# Patient Record
Sex: FEMALE | Race: WHITE | NOT HISPANIC OR LATINO | Employment: PART TIME | URBAN - METROPOLITAN AREA
[De-identification: names, ages, dates, MRNs, and addresses within clinical notes are randomized per-mention and may not be internally consistent; named-entity substitution may affect disease eponyms.]

---

## 2017-06-01 ENCOUNTER — TELEPHONE (OUTPATIENT)
Dept: FAMILY MEDICINE | Facility: CLINIC | Age: 62
End: 2017-06-01

## 2017-07-21 ENCOUNTER — HOSPITAL ENCOUNTER (OUTPATIENT)
Dept: MAMMOGRAPHY | Facility: CLINIC | Age: 62
Discharge: HOME | End: 2017-07-21
Attending: INTERNAL MEDICINE | Admitting: INTERNAL MEDICINE
Payer: COMMERCIAL

## 2017-07-21 DIAGNOSIS — Z12.31 VISIT FOR SCREENING MAMMOGRAM: ICD-10-CM

## 2017-07-21 PROCEDURE — G0202 SCR MAMMO BI INCL CAD: HCPCS

## 2017-07-29 ENCOUNTER — HEALTH MAINTENANCE LETTER (OUTPATIENT)
Age: 62
End: 2017-07-29

## 2017-10-04 ENCOUNTER — DOCUMENTATION ONLY (OUTPATIENT)
Dept: LAB | Facility: CLINIC | Age: 62
End: 2017-10-04

## 2017-10-04 DIAGNOSIS — I10 BENIGN ESSENTIAL HYPERTENSION: Primary | Chronic | ICD-10-CM

## 2017-10-04 NOTE — PROGRESS NOTES
Please review, associate diagnosis, and sign pending pre physical lab orders for patient's upcoming 10/11/17 lab appointment.     Thank you,  Lab

## 2017-10-11 DIAGNOSIS — I10 BENIGN ESSENTIAL HYPERTENSION: Chronic | ICD-10-CM

## 2017-10-11 LAB
ALBUMIN SERPL-MCNC: 4.3 G/DL (ref 3.4–5)
ALP SERPL-CCNC: 69 U/L (ref 40–150)
ALT SERPL W P-5'-P-CCNC: 29 U/L (ref 0–50)
ANION GAP SERPL CALCULATED.3IONS-SCNC: 10 MMOL/L (ref 3–14)
AST SERPL W P-5'-P-CCNC: 30 U/L (ref 0–45)
BILIRUB SERPL-MCNC: 0.8 MG/DL (ref 0.2–1.3)
BUN SERPL-MCNC: 15 MG/DL (ref 7–30)
CALCIUM SERPL-MCNC: 9.5 MG/DL (ref 8.5–10.1)
CHLORIDE SERPL-SCNC: 104 MMOL/L (ref 94–109)
CHOLEST SERPL-MCNC: 226 MG/DL
CO2 SERPL-SCNC: 26 MMOL/L (ref 20–32)
CREAT SERPL-MCNC: 0.91 MG/DL (ref 0.52–1.04)
ERYTHROCYTE [DISTWIDTH] IN BLOOD BY AUTOMATED COUNT: 11.9 % (ref 10–15)
GFR SERPL CREATININE-BSD FRML MDRD: 62 ML/MIN/1.7M2
GLUCOSE SERPL-MCNC: 95 MG/DL (ref 70–99)
HCT VFR BLD AUTO: 45.6 % (ref 35–47)
HDLC SERPL-MCNC: 100 MG/DL
HGB BLD-MCNC: 15.1 G/DL (ref 11.7–15.7)
LDLC SERPL CALC-MCNC: 105 MG/DL
MCH RBC QN AUTO: 32.1 PG (ref 26.5–33)
MCHC RBC AUTO-ENTMCNC: 33.1 G/DL (ref 31.5–36.5)
MCV RBC AUTO: 97 FL (ref 78–100)
NONHDLC SERPL-MCNC: 126 MG/DL
PLATELET # BLD AUTO: 247 10E9/L (ref 150–450)
POTASSIUM SERPL-SCNC: 4 MMOL/L (ref 3.4–5.3)
PROT SERPL-MCNC: 7.9 G/DL (ref 6.8–8.8)
RBC # BLD AUTO: 4.7 10E12/L (ref 3.8–5.2)
SODIUM SERPL-SCNC: 140 MMOL/L (ref 133–144)
TRIGL SERPL-MCNC: 105 MG/DL
WBC # BLD AUTO: 7.7 10E9/L (ref 4–11)

## 2017-10-11 PROCEDURE — 85027 COMPLETE CBC AUTOMATED: CPT | Performed by: INTERNAL MEDICINE

## 2017-10-11 PROCEDURE — 80053 COMPREHEN METABOLIC PANEL: CPT | Performed by: INTERNAL MEDICINE

## 2017-10-11 PROCEDURE — 36415 COLL VENOUS BLD VENIPUNCTURE: CPT | Performed by: INTERNAL MEDICINE

## 2017-10-11 PROCEDURE — 80061 LIPID PANEL: CPT | Performed by: INTERNAL MEDICINE

## 2017-10-17 NOTE — PROGRESS NOTES
"   SUBJECTIVE:   CC: Suzi A Friedell is an 61 year old woman who presents for preventive health visit.     Healthy Habits:    Do you get at least three servings of calcium containing foods daily (dairy, green leafy vegetables, etc.)? {YES/NO, DAIRY INTAKE:296830::\"yes\"}    Amount of exercise or daily activities, outside of work: {AMOUNT EXERCISE:180520}    Problems taking medications regularly {Yes /No default:399461::\"No\"}    Medication side effects: {Yes /No default.:237523::\"No\"}    Have you had an eye exam in the past two years? {YESNOBLANK:572152}    Do you see a dentist twice per year? {YESNOBLANK:467020}    Do you have sleep apnea, excessive snoring or daytime drowsiness?{YESNOBLANK:446841}    {Outside tests to abstract? :277716}    {additional problems to add (Optional):255379}    Today's PHQ-2 Score:   PHQ-2 ( 1999 Pfizer) 10/15/2017 10/17/2016   Q1: Little interest or pleasure in doing things 0 0   Q2: Feeling down, depressed or hopeless 0 0   PHQ-2 Score 0 0   Q1: Little interest or pleasure in doing things Not at all -   Q2: Feeling down, depressed or hopeless Not at all -   PHQ-2 Score 0 -     {PHQ-2 LOOK IN ASSESSMENTS (Optional) :124085}  Abuse: Current or Past(Physical, Sexual or Emotional)- {YES/NO/NA:053853}  Do you feel safe in your environment - {YES/NO/NA:132562}    Social History   Substance Use Topics     Smoking status: Never Smoker     Smokeless tobacco: Never Used     Alcohol use 0.0 oz/week     0 Standard drinks or equivalent per week      Comment: 1 beer a night     {ETOH AUDIT:280674}    Reviewed orders with patient.  Reviewed health maintenance and updated orders accordingly - {Yes/No:944087::\"Yes\"}  {Chronicprobdata (Optional):252958}    {Mammo Decision Support (Optional):529683}    Pertinent mammograms are reviewed under the imaging tab.  History of abnormal Pap smear: {PAP HX:358689}    Reviewed and updated as needed this visit by clinical staff         Reviewed and updated as needed " "this visit by Provider        {HISTORY OPTIONS (Optional):963816}    ROS:  {FEMALE PREVENTATIVE ROS:318513}    OBJECTIVE:   There were no vitals taken for this visit.  EXAM:  {Exam Choices:261873}    ASSESSMENT/PLAN:   {Diag Picklist:945217}    COUNSELING:   {FEMALE COUNSELING MESSAGES:289169::\"Reviewed preventive health counseling, as reflected in patient instructions\"}    {BP Counseling- Complete if BP >= 120/80  (Optional):062560}     reports that she has never smoked. She has never used smokeless tobacco.  {Tobacco Cessation -- Complete if patient is a smoker (Optional):957912}  Estimated body mass index is 19.01 kg/(m^2) as calculated from the following:    Height as of 10/17/16: 5' 2.4\" (1.585 m).    Weight as of 10/17/16: 105 lb 4.8 oz (47.8 kg).   {Weight Management Plan (ACO) Complete if BMI is abnormal-  Ages 18-64  BMI >24.9.  Age 65+ with BMI <23 or >30 (Optional):117564}    Counseling Resources:  ATP IV Guidelines  Pooled Cohorts Equation Calculator  Breast Cancer Risk Calculator  FRAX Risk Assessment  ICSI Preventive Guidelines  Dietary Guidelines for Americans, 2010  USDA's MyPlate  ASA Prophylaxis  Lung CA Screening    Esther Flores,   [facility]  "

## 2017-10-17 NOTE — PATIENT INSTRUCTIONS
Keep up the great work  Follow up annually or as needed    Preventive Health Recommendations  Female Ages 50 - 64    Yearly exam: See your health care provider every year in order to  o Review health changes.   o Discuss preventive care.    o Review your medicines if your doctor has prescribed any.      Get a Pap test every three years (unless you have an abnormal result and your provider advises testing more often).    If you get Pap tests with HPV test, you only need to test every 5 years, unless you have an abnormal result.     You do not need a Pap test if your uterus was removed (hysterectomy) and you have not had cancer.    You should be tested each year for STDs (sexually transmitted diseases) if you're at risk.     Have a mammogram every 1 to 2 years.    Have a colonoscopy at age 50, or have a yearly FIT test (stool test). These exams screen for colon cancer.      Have a cholesterol test every 5 years, or more often if advised.    Have a diabetes test (fasting glucose) every three years. If you are at risk for diabetes, you should have this test more often.     If you are at risk for osteoporosis (brittle bone disease), think about having a bone density scan (DEXA).    Shots: Get a flu shot each year. Get a tetanus shot every 10 years.    Nutrition:     Eat at least 5 servings of fruits and vegetables each day.    Eat whole-grain bread, whole-wheat pasta and brown rice instead of white grains and rice.    Talk to your provider about Calcium and Vitamin D.     Lifestyle    Exercise at least 150 minutes a week (30 minutes a day, 5 days a week). This will help you control your weight and prevent disease.    Limit alcohol to one drink per day.    No smoking.     Wear sunscreen to prevent skin cancer.     See your dentist every six months for an exam and cleaning.    See your eye doctor every 1 to 2 years.

## 2017-10-17 NOTE — PROGRESS NOTES
SUBJECTIVE:   CC: Suzi A Friedell is an 61 year old woman who presents for preventive health visit.     Physical   Annual:     Getting at least 3 servings of Calcium per day::  Yes    Bi-annual eye exam::  Yes    Dental care twice a year::  Yes    Sleep apnea or symptoms of sleep apnea::  None    Diet::  Regular (no restrictions)    Frequency of exercise::  6-7 days/week    Duration of exercise::  Greater than 60 minutes    Taking medications regularly::  Yes    Medication side effects::  Not applicable    Additional concerns today::  No    Elsy is here in follow up today for her annual exam  Had previsit great fasting labs  BPs great at home  3 miles running in AM and 2 miles walking in afternoon  Several years ago abnormal pap but normal for several years since  H/o benign genital cyst that pops over time  No breast changes  Feels really healthy  New 4 month old grandson in [place]      Today's PHQ-2 Score:   PHQ-2 ( 1999 Pfizer) 10/18/2017   Q1: Little interest or pleasure in doing things 0   Q2: Feeling down, depressed or hopeless 0   PHQ-2 Score 0   Q1: Little interest or pleasure in doing things -   Q2: Feeling down, depressed or hopeless -   PHQ-2 Score -       Abuse: Current or Past(Physical, Sexual or Emotional)- No  Do you feel safe in your environment - Yes    Social History   Substance Use Topics     Smoking status: Never Smoker     Smokeless tobacco: Never Used     Alcohol use 0.0 oz/week      Comment: 1 beer a night     The patient does not drink >3 drinks per day nor >7 drinks per week.    Reviewed orders with patient.  Reviewed health maintenance and updated orders accordingly - Yes  Patient over age 50, mutual decision to screen reflected in health maintenance.  Pertinent mammograms are reviewed under the imaging tab.  History of abnormal Pap smear: NO - age 30-65 PAP every 5 years with negative HPV co-testing recommended - Normal pap in 2011 and 2014 per [facility]     ROS:  Comprehensive ROS  "negative unless as stated above in HPI.       OBJECTIVE:   /89 (BP Location: Left arm, Cuff Size: Adult Regular)  Pulse 86  Temp 97.5  F (36.4  C) (Oral)  Ht 5' 2.4\" (1.585 m)  Wt 103 lb (46.7 kg)  SpO2 100%  Breastfeeding? No  BMI 18.6 kg/m2  EXAM:  GENERAL APPEARANCE: healthy, alert and no distress  EYES: Eyes grossly normal to inspection, PERRL and conjunctivae and sclerae normal  HENT: ear canals and TM's normal, nose and mouth without ulcers or lesions, oropharynx clear and oral mucous membranes moist  NECK: no adenopathy, no asymmetry, masses, or scars and thyroid normal to palpation  RESP: lungs clear to auscultation - no rales, rhonchi or wheezes  CV: regular rate and rhythm, normal S1 S2, no S3 or S4, no murmur, click or rub, no peripheral edema and no carotid bruits  ABDOMEN: soft, nontender, no hepatosplenomegaly, no masses and bowel sounds normal   (female): normal female external genitalia, normal urethral meatus, vaginal mucosal atrophy noted, normal cervix, adnexae, and uterus without masses or abnormal discharge  MS: no musculoskeletal defects are noted and gait is age appropriate without ataxia  SKIN: no suspicious lesions or rashes  NEURO: Normal strength and tone, mentation intact and speech normal  PSYCH: mentation appears normal and affect normal/bright    ASSESSMENT/PLAN:   1. Preventative health care  She declines colonoscopy maybe even next year  She brought in a copy of her normal colonoscopy done at [place] on 6/5/2008 by Dr. Chua  Also her DEXA was done 3/27/2008 at [facility] showing osteoporosis of left hip at T score -2.5; R hip -1.8 and Lspine -1.9.   Reviewed excellent previsit fasting labs    2. Benign essential hypertension; goal < 140/90  At goal (but even better at home)  - lisinopril (PRINIVIL/ZESTRIL) 2.5 MG tablet; Take 1 tablet (2.5 mg) by mouth daily  Dispense: 90 tablet; Refill: 3    3. Screening for malignant neoplasm of cervix  Plan that this could be last pap " "if within normal limits   Normal pap in 2011 and 2014 per [facility]  - Pap imaged thin layer screen with HPV - recommended age 30 - 65 years (select HPV order below)  - HPV High Risk Types DNA Cervical    COUNSELING:  Reviewed preventive health counseling, as reflected in patient instructions       Regular exercise       Healthy diet/nutrition   reports that she has never smoked. She has never used smokeless tobacco.  Estimated body mass index is 18.6 kg/(m^2) as calculated from the following:    Height as of this encounter: 5' 2.4\" (1.585 m).    Weight as of this encounter: 103 lb (46.7 kg).       Patient Instructions   Keep up the great work  Follow up annually or as needed        Esther Flores, DO  [facility]    "

## 2017-10-18 ENCOUNTER — OFFICE VISIT (OUTPATIENT)
Dept: FAMILY MEDICINE | Facility: CLINIC | Age: 62
End: 2017-10-18
Payer: COMMERCIAL

## 2017-10-18 VITALS
BODY MASS INDEX: 18.95 KG/M2 | OXYGEN SATURATION: 100 % | HEIGHT: 62 IN | HEART RATE: 86 BPM | TEMPERATURE: 97.5 F | WEIGHT: 103 LBS | SYSTOLIC BLOOD PRESSURE: 131 MMHG | DIASTOLIC BLOOD PRESSURE: 89 MMHG

## 2017-10-18 DIAGNOSIS — Z12.4 SCREENING FOR MALIGNANT NEOPLASM OF CERVIX: ICD-10-CM

## 2017-10-18 DIAGNOSIS — I10 BENIGN ESSENTIAL HYPERTENSION: Chronic | ICD-10-CM

## 2017-10-18 DIAGNOSIS — Z00.00 PREVENTATIVE HEALTH CARE: Primary | ICD-10-CM

## 2017-10-18 PROCEDURE — 87624 HPV HI-RISK TYP POOLED RSLT: CPT | Performed by: INTERNAL MEDICINE

## 2017-10-18 PROCEDURE — G0145 SCR C/V CYTO,THINLAYER,RESCR: HCPCS | Performed by: INTERNAL MEDICINE

## 2017-10-18 PROCEDURE — 99396 PREV VISIT EST AGE 40-64: CPT | Performed by: INTERNAL MEDICINE

## 2017-10-18 RX ORDER — LISINOPRIL 2.5 MG/1
2.5 TABLET ORAL DAILY
Qty: 90 TABLET | Refills: 3 | Status: SHIPPED | OUTPATIENT
Start: 2017-10-18 | End: 2018-10-17

## 2017-10-18 NOTE — MR AVS SNAPSHOT
After Visit Summary   10/18/2017    Suzi A Friedell    MRN: [id number]           Patient Information     Date Of Birth          1955        Visit Information        Provider Department      10/18/2017 9:30 AM Esther Flores, DO [facility]        Today's Diagnoses     Preventative health care    -  1    Benign essential hypertension; goal < 140/90        Screening for malignant neoplasm of cervix          Care Instructions    Keep up the great work  Follow up annually or as needed    Preventive Health Recommendations  Female Ages 50 - 64    Yearly exam: See your health care provider every year in order to  o Review health changes.   o Discuss preventive care.    o Review your medicines if your doctor has prescribed any.      Get a Pap test every three years (unless you have an abnormal result and your provider advises testing more often).    If you get Pap tests with HPV test, you only need to test every 5 years, unless you have an abnormal result.     You do not need a Pap test if your uterus was removed (hysterectomy) and you have not had cancer.    You should be tested each year for STDs (sexually transmitted diseases) if you're at risk.     Have a mammogram every 1 to 2 years.    Have a colonoscopy at age 50, or have a yearly FIT test (stool test). These exams screen for colon cancer.      Have a cholesterol test every 5 years, or more often if advised.    Have a diabetes test (fasting glucose) every three years. If you are at risk for diabetes, you should have this test more often.     If you are at risk for osteoporosis (brittle bone disease), think about having a bone density scan (DEXA).    Shots: Get a flu shot each year. Get a tetanus shot every 10 years.    Nutrition:     Eat at least 5 servings of fruits and vegetables each day.    Eat whole-grain bread, whole-wheat pasta and brown rice instead of white grains and rice.    Talk to your provider about Calcium and Vitamin  "D.     Lifestyle    Exercise at least 150 minutes a week (30 minutes a day, 5 days a week). This will help you control your weight and prevent disease.    Limit alcohol to one drink per day.    No smoking.     Wear sunscreen to prevent skin cancer.     See your dentist every six months for an exam and cleaning.    See your eye doctor every 1 to 2 years.            Follow-ups after your visit        Who to contact     If you have questions or need follow up information about today's clinic visit or your schedule please contact [facility] directly at [phone].  Normal or non-critical lab and imaging results will be communicated to you by JumpTheClubhart, letter or phone within 4 business days after the clinic has received the results. If you do not hear from us within 7 days, please contact the clinic through Fanaticallt or phone. If you have a critical or abnormal lab result, we will notify you by phone as soon as possible.  Submit refill requests through [facility] or call your pharmacy and they will forward the refill request to us. Please allow 3 business days for your refill to be completed.          Additional Information About Your Visit        MyChart Information     [facility] gives you secure access to your electronic health record. If you see a primary care provider, you can also send messages to your care team and make appointments. If you have questions, please call your primary care clinic.  If you do not have a primary care provider, please call [phone] and they will assist you.        Care EveryWhere ID     This is your Care EveryWhere ID. This could be used by other organizations to access your [facility] medical records  [id number]        Your Vitals Were     Pulse Temperature Height Pulse Oximetry Breastfeeding? BMI (Body Mass Index)    86 97.5  F (36.4  C) (Oral) 5' 2.4\" (1.585 m) 100% No 18.6 kg/m2       Blood Pressure from Last 3 Encounters:   10/18/17 131/89   10/17/16 130/88    Weight from " Last 3 Encounters:   10/18/17 103 lb (46.7 kg)   10/17/16 105 lb 4.8 oz (47.8 kg)              We Performed the Following     HPV High Risk Types DNA Cervical     Pap imaged thin layer screen with HPV - recommended age 30 - 65 years (select HPV order below)          Where to get your medicines      These medications were sent to [id number] - [place], [place] - [address] [facility] [address]  [address] [place] [address] [address]     Phone:  [phone]     lisinopril 2.5 MG tablet          Primary Care Provider Office Phone # Fax #    Esther Flores,  [phone] [address]  [place] [address]        Equal Access to Services     YUMIKO MERINO : Hadii dominik hua hadasho Soomaali, waaxda luqadaha, qaybta kaalmada adeegyada, adilia whitlock . So [facility] [phone].    ATENCIÓN: Si habla español, tiene a biggs disposición servicios gratuitos de asistencia lingüística. Leodan al [phone].    We comply with applicable federal civil rights laws and [place] laws. We do not discriminate on the basis of race, color, national origin, age, disability, sex, sexual orientation, or gender identity.            Thank you!     Thank you for choosing [facility]  for your care. Our goal is always to provide you with excellent care. Hearing back from our patients is one way we can continue to improve our services. Please take a few minutes to complete the written survey that you may receive in the mail after your visit with us. Thank you!             Your Updated Medication List - Protect others around you: Learn how to safely use, store and throw away your medicines at www.disposemymeds.org.          This list is accurate as of: 10/18/17 10:02 AM.  Always use your most recent med list.                   Brand Name Dispense Instructions for use Diagnosis    CHOLESTOFF PO      Take 3 tablets by mouth daily        GLUCOSAMINE CHOND COMPLEX/MSM  PO      Take 1 tablet by mouth daily        lisinopril 2.5 MG tablet    PRINIVIL/Zestril    90 tablet    Take 1 tablet (2.5 mg) by mouth daily    Benign essential hypertension       loratadine 10 MG tablet    CLARITIN     Take 10 mg by mouth daily        Magnesium 400 MG Caps      Take 1 capsule by mouth daily        MULTI VITAMIN PO      Take 1 tablet by mouth daily        VITAMIN D3 PO      Take 400 Units by mouth daily

## 2017-10-18 NOTE — NURSING NOTE
"Chief Complaint   Patient presents with     Physical       Initial /89 (BP Location: Left arm, Cuff Size: Adult Regular)  Pulse 86  Temp 97.5  F (36.4  C) (Oral)  Ht 5' 2.4\" (1.585 m)  Wt 103 lb (46.7 kg)  SpO2 100%  Breastfeeding? No  BMI 18.6 kg/m2 Estimated body mass index is 18.6 kg/(m^2) as calculated from the following:    Height as of this encounter: 5' 2.4\" (1.585 m).    Weight as of this encounter: 103 lb (46.7 kg).  Medication Reconciliation: complete       BURAK Steele      "

## 2017-10-23 LAB
COPATH REPORT: NORMAL
PAP: NORMAL

## 2017-10-24 LAB
FINAL DIAGNOSIS: NORMAL
HPV HR 12 DNA CVX QL NAA+PROBE: NEGATIVE
HPV16 DNA SPEC QL NAA+PROBE: NEGATIVE
HPV18 DNA SPEC QL NAA+PROBE: NEGATIVE
SPECIMEN DESCRIPTION: NORMAL

## 2018-01-05 DIAGNOSIS — I10 BENIGN ESSENTIAL HYPERTENSION: ICD-10-CM

## 2018-01-05 NOTE — TELEPHONE ENCOUNTER
Requested Prescriptions   Pending Prescriptions Disp Refills     lisinopril (PRINIVIL/ZESTRIL) 2.5 MG tablet [Pharmacy Med Name: LISINOPRIL 2.5MG TABLETS] 90 tablet 0     Sig: TAKE 1 TABLET(2.5 MG) BY MOUTH DAILY    ACE Inhibitors (Including Combos) Protocol Passed    1/5/2018  3:35 AM       Passed - Blood pressure under 140/90    BP Readings from Last 3 Encounters:   10/18/17 131/89   10/17/16 130/88                Passed - Recent or future visit with authorizing provider's specialty    Patient had office visit in the last year or has a visit in the next 30 days with authorizing provider.  See chart review.              Passed - Patient is age 18 or older       Passed - No active pregnancy on record       Passed - Normal serum creatinine on file in past 12 months    Recent Labs   Lab Test  10/11/17   0725   CR  0.91            Passed - Normal serum potassium on file in past 12 months    Recent Labs   Lab Test  10/11/17   0725   POTASSIUM  4.0            Passed - No positive pregnancy test in past 12 months        lisinopril (PRINIVIL/ZESTRIL) 2.5 MG tablet 90 tablet 3 10/18/2017       Last Written Prescription Date:  10/18/2017  Last Fill Quantity: 90,  # refills: 3   Last Office Visit with G, P or [facility] prescribing provider:  10/18/2017   Future Office Visit: Unknown

## 2018-01-08 RX ORDER — LISINOPRIL 2.5 MG/1
TABLET ORAL
Refills: 0
Start: 2018-01-08

## 2018-03-01 ENCOUNTER — TRANSFERRED RECORDS (OUTPATIENT)
Dept: HEALTH INFORMATION MANAGEMENT | Facility: CLINIC | Age: 63
End: 2018-03-01

## 2018-03-05 ENCOUNTER — OFFICE VISIT (OUTPATIENT)
Dept: FAMILY MEDICINE | Facility: CLINIC | Age: 63
End: 2018-03-05
Payer: COMMERCIAL

## 2018-03-05 VITALS
TEMPERATURE: 97.5 F | WEIGHT: 106 LBS | OXYGEN SATURATION: 99 % | BODY MASS INDEX: 19.51 KG/M2 | DIASTOLIC BLOOD PRESSURE: 91 MMHG | HEART RATE: 86 BPM | HEIGHT: 62 IN | SYSTOLIC BLOOD PRESSURE: 137 MMHG

## 2018-03-05 DIAGNOSIS — Z01.818 PREOP GENERAL PHYSICAL EXAM: Primary | ICD-10-CM

## 2018-03-05 DIAGNOSIS — H35.372 MACULAR PUCKERING OF RETINA, LEFT: ICD-10-CM

## 2018-03-05 LAB
ANION GAP SERPL CALCULATED.3IONS-SCNC: 5 MMOL/L (ref 3–14)
BUN SERPL-MCNC: 25 MG/DL (ref 7–30)
CALCIUM SERPL-MCNC: 9.3 MG/DL (ref 8.5–10.1)
CHLORIDE SERPL-SCNC: 104 MMOL/L (ref 94–109)
CO2 SERPL-SCNC: 30 MMOL/L (ref 20–32)
CREAT SERPL-MCNC: 0.9 MG/DL (ref 0.52–1.04)
GFR SERPL CREATININE-BSD FRML MDRD: 63 ML/MIN/1.7M2
GLUCOSE SERPL-MCNC: 94 MG/DL (ref 70–99)
POTASSIUM SERPL-SCNC: 4.3 MMOL/L (ref 3.4–5.3)
SODIUM SERPL-SCNC: 139 MMOL/L (ref 133–144)

## 2018-03-05 PROCEDURE — 36415 COLL VENOUS BLD VENIPUNCTURE: CPT | Performed by: NURSE PRACTITIONER

## 2018-03-05 PROCEDURE — 99214 OFFICE O/P EST MOD 30 MIN: CPT | Performed by: NURSE PRACTITIONER

## 2018-03-05 PROCEDURE — 80048 BASIC METABOLIC PNL TOTAL CA: CPT | Performed by: NURSE PRACTITIONER

## 2018-03-05 NOTE — MR AVS SNAPSHOT
After Visit Summary   3/5/2018    Suzi A Friedell    MRN: [id number]           Patient Information     Date Of Birth          1955        Visit Information        Provider Department      3/5/2018 9:00 AM Nicolasa Reddy APRN [facility]        Today's Diagnoses     Preop general physical exam    -  1    Macular puckering of retina, left          Care Instructions      Before Your Surgery      Call your surgeon if there is any change in your health. This includes signs of a cold or flu (such as a sore throat, runny nose, cough, rash or fever).    Do not smoke, drink alcohol or take over the counter medicine (unless your surgeon or primary care doctor tells you to) for the 24 hours before and after surgery.    If you take prescribed drugs: Follow your doctor s orders about which medicines to take and which to stop until after surgery.    Eating and drinking prior to surgery: follow the instructions from your surgeon    Take a shower or bath the night before surgery. Use the soap your surgeon gave you to gently clean your skin. If you do not have soap from your surgeon, use your regular soap. Do not shave or scrub the surgery site.  Wear clean pajamas and have clean sheets on your bed.     Before Your Surgery      Call your surgeon if there is any change in your health. This includes signs of a cold or flu (such as a sore throat, runny nose, cough, rash or fever).    Do not smoke, drink alcohol or take over the counter medicine (unless your surgeon or primary care doctor tells you to) for the 24 hours before and after surgery.    If you take prescribed drugs: Follow your doctor s orders about which medicines to take and which to stop until after surgery.    Eating and drinking prior to surgery: follow the instructions from your surgeon    Take a shower or bath the night before surgery. Use the soap your surgeon gave you to gently clean your skin. If you do not have soap from your  "surgeon, use your regular soap. Do not shave or scrub the surgery site.  Wear clean pajamas and have clean sheets on your bed.           Follow-ups after your visit        Follow-up notes from your care team     Return if symptoms worsen or fail to improve.      Your next 10 appointments already scheduled     Mar 15, 2018   Procedure with Harley Lindquist MD   [facility] (--)    [address], [address]  [facility] [address] [phone]              Who to contact     If you have questions or need follow up information about today's clinic visit or your schedule please contact [facility] directly at [phone].  Normal or non-critical lab and imaging results will be communicated to you by Vanuhart, letter or phone within 4 business days after the clinic has received the results. If you do not hear from us within 7 days, please contact the clinic through Vanuhart or phone. If you have a critical or abnormal lab result, we will notify you by phone as soon as possible.  Submit refill requests through Smartjog or call your pharmacy and they will forward the refill request to us. Please allow 3 business days for your refill to be completed.          Additional Information About Your Visit        VanuharApnaPaisa Information     Smartjog gives you secure access to your electronic health record. If you see a primary care provider, you can also send messages to your care team and make appointments. If you have questions, please call your primary care clinic.  If you do not have a primary care provider, please call [phone] and they will assist you.        Care EveryWhere ID     This is your Care EveryWhere ID. This could be used by other organizations to access your [place] medical records  [id number]        Your Vitals Were     Pulse Temperature Height Pulse Oximetry BMI (Body Mass Index)       86 97.5  F (36.4  C) (Tympanic) 5' 2.4\" (1.585 m) 99% 19.14 kg/m2        Blood Pressure " from Last 3 Encounters:   03/05/18 (!) 137/91   10/18/17 131/89   10/17/16 130/88    Weight from Last 3 Encounters:   03/05/18 106 lb (48.1 kg)   10/18/17 103 lb (46.7 kg)   10/17/16 105 lb 4.8 oz (47.8 kg)              We Performed the Following     Basic metabolic panel        Primary Care Provider Office Phone # Fax #    Esther Flores,  [phone] [address]  [facility] [address]        Equal Access to Services     [facility]: Hadii aad ku hadasho Soomaali, waaxda luqadaha, qaybta kaalmada adeegyada, adilia mccoy hayjanett whitlock . So [facility] [phone].    ATENCIÓN: Si habla español, tiene a biggs disposición servicios gratuitos de asistencia lingüística. Llame al [phone].    We comply with applicable federal civil rights laws and [place] laws. We do not discriminate on the basis of race, color, national origin, age, disability, sex, sexual orientation, or gender identity.            Thank you!     Thank you for choosing [facility]  for your care. Our goal is always to provide you with excellent care. Hearing back from our patients is one way we can continue to improve our services. Please take a few minutes to complete the written survey that you may receive in the mail after your visit with us. Thank you!             Your Updated Medication List - Protect others around you: Learn how to safely use, store and throw away your medicines at www.disposemymeds.org.          This list is accurate as of 3/5/18 12:40 PM.  Always use your most recent med list.                   Brand Name Dispense Instructions for use Diagnosis    CHOLESTOFF PO      Take 3 tablets by mouth daily        GLUCOSAMINE CHOND COMPLEX/MSM PO      Take 1 tablet by mouth daily        lisinopril 2.5 MG tablet    PRINIVIL/Zestril    90 tablet    Take 1 tablet (2.5 mg) by mouth daily    Benign essential hypertension       loratadine 10 MG tablet    CLARITIN     Take 10 mg by mouth  daily        Magnesium 400 MG Caps      Take 1 capsule by mouth daily        MULTI VITAMIN PO      Take 1 tablet by mouth daily        VITAMIN D3 PO      Take 400 Units by mouth daily

## 2018-03-05 NOTE — PROGRESS NOTES
"[facility]  [address] [facility] [address]  [phone]  Dept: [phone]    PRE-OP EVALUATION:  Today's date: 3/5/2018    Suzi A Friedell (: 1955) presents for pre-operative evaluation assessment as requested by Dr. Lindquist.  She requires evaluation and anesthesia risk assessment prior to undergoing surgery/procedure for treatment of Eye .    Proposed Surgery/ Procedure: VITRECTOMY PARSPLANA WITH 25 GAUGE SYSTEM   Date of Surgery/ Procedure: 03/15/18  Time of Surgery/ Procedure: 7:30 a.m.  Hospital/Surgical Facility: [facility]  Fax number for surgical facility:   Primary Physician: Esther Flores  Type of Anesthesia Anticipated: Combined MAC with Retrobulbar    Patient has a Health Care Directive or Living Will:  {YES/NO:041914::\"NO\"}    {PREOP QUESTIONNAIRE OPTIONS(by MA):743018::\"1. NO - Do you have a history of heart attack, stroke, stent, bypass or surgery on an artery in the head, neck, heart or legs?\",\"2. NO - Do you ever have any pain or discomfort in your chest?\",\"3. NO - Do you have a history of  Heart Failure?\",\"4. NO - Are you troubled by shortness of breath when: walking on the level, up a slight hill or at night?\",\"5. NO - Do you currently have a cold, bronchitis or other respiratory infection?\",\"6. NO - Do you have a cough, shortness of breath or wheezing?\",\"7. NO - Do you sometimes get pains in the calves of your legs when you walk?\",\"8. NO - Do you or anyone in your family have previous history of blood clots?\",\"9. NO - Do you or does anyone in your family have a serious bleeding problem such as prolonged bleeding following surgeries or cuts?\",\"10. NO - Have you ever had problems with anemia or been told to take iron pills?\",\"11. NO - Have you had any abnormal blood loss such as black, tarry or bloody stools, or abnormal vaginal bleeding?\",\"12. NO - Have you ever had a blood transfusion?\",\"13. NO - Have you or any of your relatives ever had problems with " "anesthesia?\",\"14. NO - Do you have sleep apnea, excessive snoring or daytime drowsiness?\",\"15. NO - Do you have any prosthetic heart valves?\",\"16. NO - Do you have prosthetic joints?\",\"17. NO - Is there any chance that you may be pregnant?\"}      HPI:     HPI related to upcoming procedure: ***      {. Problems:596481}    MEDICAL HISTORY:     Patient Active Problem List    Diagnosis Date Noted     Benign essential hypertension; goal < 140/90 10/17/2016     Priority: Medium      Past Medical History:   Diagnosis Date     Arthritis     Osteoarthritis     Hypertension      Osteopenia     Did not tolerate Fosamax x1 pill     Past Surgical History:   Procedure Laterality Date      SECTION       COLONOSCOPY  08     EYE SURGERY      Lasik     Current Outpatient Prescriptions   Medication Sig Dispense Refill     lisinopril (PRINIVIL/ZESTRIL) 2.5 MG tablet Take 1 tablet (2.5 mg) by mouth daily 90 tablet 3     Plant Sterols and Stanols (CHOLESTOFF PO) Take 3 tablets by mouth daily        Multiple Vitamin (MULTI VITAMIN PO) Take 1 tablet by mouth daily       Cholecalciferol (VITAMIN D3 PO) Take 400 Units by mouth daily       Magnesium 400 MG CAPS Take 1 capsule by mouth daily       Misc Natural Products (GLUCOSAMINE CHOND COMPLEX/MSM PO) Take 1 tablet by mouth daily       loratadine (CLARITIN) 10 MG tablet Take 10 mg by mouth daily       OTC products: {OTC ANALGESICS:026244}    Allergies   Allergen Reactions     Fosamax [Alendronic Acid] Other (See Comments)     Joint pain, unable to move        Latex Allergy: {YES/NO WITH DEFAULT:239775::\"NO\"}    Social History   Substance Use Topics     Smoking status: Never Smoker     Smokeless tobacco: Never Used     Alcohol use 0.0 oz/week      Comment: 1 beer a night     History   Drug Use No       REVIEW OF SYSTEMS:   {ROS Preop Choices:760302}    EXAM:   There were no vitals taken for this visit.  {EXAM Preop Choices:642962}    DIAGNOSTICS:   {DIAGNOSTIC FOR " "PREOP:681378}    Recent Labs   Lab Test  10/11/17   0725  10/17/16   1439   HGB  15.1  14.9   PLT  247  251   NA  140  137   POTASSIUM  4.0  5.0   CR  0.91  0.94        IMPRESSION:   {PREOP REASONS:658342::\"Reason for surgery/procedure: ***\",\"Diagnosis/reason for consult: ***\"}    The proposed surgical procedure is considered {HIGH=major cardiovascular or procedures requiring prolonged anesthesia >4 hours or large fluid shifts;    INTERMEDIATE=abdominal, most orthopedic and intrathoracic surgery; LOW= endoscopy, cataract and breast surgery:013079} risk.    REVISED CARDIAC RISK INDEX  The patient has the following serious cardiovascular risks for perioperative complications such as (MI, PE, VFib and 3  AV Block):  {PREOP REVISED CARDIAC INDEX (RCI):752013:p:\"No serious cardiac risks\"}  INTERPRETATION: {REVISED CARDIAC RISK INTERPRETATION:366171}    The patient has the following additional risks for perioperative complications:  {Additional perioperative risks:694330:p:\"No identified additional risks\"}      ICD-10-CM    1. Preop general physical exam Z01.818        RECOMMENDATIONS:     {IMPORTANT - Conditions - complete carefully!!:193676}    {IMPORTANT - Medications:922678::\"--Patient is to take all scheduled medications on the day of surgery EXCEPT for modifications listed below.\"}    {IMPORTANT - Approval:416414:p:\"APPROVAL GIVEN to proceed with proposed procedure, without further diagnostic evaluation\"}       Signed Electronically by: VERONICA Green CNP    Copy of this evaluation report is provided to requesting physicianJessica [facility]ran Preop Guidelines  "

## 2018-03-05 NOTE — PROGRESS NOTES
[place] [address] [facility] [address]  [phone]  Dept: [phone]    PRE-OP EVALUATION:  Today's date: 3/5/2018    Suzi A Friedell (: 1955) presents for pre-operative evaluation assessment as requested by Dr. Lindquist.  She requires evaluation and anesthesia risk assessment prior to undergoing surgery/procedure for treatment of eye .    Fax number for surgical facility:   Primary Physician: Esther Flores  Type of Anesthesia Anticipated: Combined MAC with Retrobulbar    Patient has a Health Care Directive or Living Will:  NO    Preop Questions 3/3/2018   Who is doing your surgery? Dr. Lindquist   What are you having done? vitrectomy/epiretinal membrane   Date of Surgery/Procedure: 3/15/2018   Facility or Hospital where procedure/surgery will be performed: [facility]   1.  Do you have a history of Heart attack, stroke, stent, coronary bypass surgery, or other heart surgery? No   2.  Do you ever have any pain or discomfort in your chest? No   3.  Do you have a history of  Heart Failure? No   4.   Are you troubled by shortness of breath when:  walking on a level surface, or up a slight hill, or at night? No   5.  Do you currently have a cold, bronchitis or other respiratory infection? No   6.  Do you have a cough, shortness of breath, or wheezing? No   7.  Do you sometimes get pains in the calves of your legs when you walk? No   8. Do you or anyone in your family have previous history of blood clots? No   9.  Do you or does anyone in your family have a serious bleeding problem such as prolonged bleeding following surgeries or cuts? No   10. Have you ever had problems with anemia or been told to take iron pills? No   11. Have you had any abnormal blood loss such as black, tarry or bloody stools, or abnormal vaginal bleeding? No   12. Have you ever had a blood transfusion? No   13. Have you or any of your relatives ever had problems with anesthesia? UNKNOWN -     14. Do you have sleep apnea, excessive snoring or daytime drowsiness? No   15. Do you have any prosthetic heart valves? No   16. Do you have prosthetic joints? No   17. Is there any chance that you may be pregnant? No         HPI:     HPI related to upcoming procedure: vision impairment in left eye occurring over years due to macular puckering      HYPERTENSION - Patient has longstanding history of HTN , currently denies any symptoms referable to elevated blood pressure. Specifically denies chest pain, palpitations, dyspnea, orthopnea, PND or peripheral edema. Blood pressure readings have been in normal range. Current medication regimen is as listed below. Patient denies any side effects of medication.                                                                                                                                                                                          .    MEDICAL HISTORY:     Patient Active Problem List    Diagnosis Date Noted     Benign essential hypertension; goal < 140/90 10/17/2016     Priority: Medium      Past Medical History:   Diagnosis Date     Arthritis     Osteoarthritis     Hypertension      Osteopenia     Did not tolerate Fosamax x1 pill     Past Surgical History:   Procedure Laterality Date      SECTION       COLONOSCOPY  08     EYE SURGERY      Lasik     Current Outpatient Prescriptions   Medication Sig Dispense Refill     lisinopril (PRINIVIL/ZESTRIL) 2.5 MG tablet Take 1 tablet (2.5 mg) by mouth daily 90 tablet 3     Plant Sterols and Stanols (CHOLESTOFF PO) Take 3 tablets by mouth daily        Multiple Vitamin (MULTI VITAMIN PO) Take 1 tablet by mouth daily       Cholecalciferol (VITAMIN D3 PO) Take 400 Units by mouth daily       Magnesium 400 MG CAPS Take 1 capsule by mouth daily       Misc Natural Products (GLUCOSAMINE CHOND COMPLEX/MSM PO) Take 1 tablet by mouth daily       loratadine (CLARITIN) 10 MG tablet Take 10 mg by mouth daily       OTC  "products: NSAIDS rarely    Allergies   Allergen Reactions     Fosamax [Alendronic Acid] Other (See Comments)     Joint pain, unable to move        Latex Allergy: NO    Social History   Substance Use Topics     Smoking status: Never Smoker     Smokeless tobacco: Never Used     Alcohol use 0.0 oz/week      Comment: 1 beer a night     History   Drug Use No       REVIEW OF SYSTEMS:   CONSTITUTIONAL: NEGATIVE for fever, chills, change in weight  ENT/MOUTH: NEGATIVE for ear, mouth and throat problems  RESP: NEGATIVE for significant cough or SOB  CV: NEGATIVE for chest pain, palpitations or peripheral edema  PSYCHIATRIC: NEGATIVE for changes in mood or affect and POSITIVE for anxiety about surgery  ROS otherwise negative    EXAM:   BP (!) 137/91 (BP Location: Left arm, Cuff Size: Adult Regular)  Pulse 86  Temp 97.5  F (36.4  C) (Tympanic)  Ht 5' 2.4\" (1.585 m)  Wt 106 lb (48.1 kg)  SpO2 99%  BMI 19.14 kg/m2    GENERAL APPEARANCE: healthy, alert and no distress  HENT: ear canals and TM's normal and nose and mouth without ulcers or lesions  RESP: lungs clear to auscultation - no rales, rhonchi or wheezes  CV: regular rate and rhythm, normal S1 S2, no S3 or S4 and no murmur, click or rub   ABDOMEN: soft, nontender, no HSM or masses and bowel sounds normal  NEURO: Normal strength and tone, sensory exam grossly normal, mentation intact and speech normal    DIAGNOSTICS:   EKG: Not indicated due to non-vascular surgery and low risk of event (age <65 and without cardiac risk factors)    Recent Labs   Lab Test  10/11/17   0725  10/17/16   1439   HGB  15.1  14.9   PLT  247  251   NA  140  137   POTASSIUM  4.0  5.0   CR  0.91  0.94     Results for orders placed or performed in visit on 03/05/18 (from the past 24 hour(s))   Basic metabolic panel   Result Value Ref Range    Sodium 139 133 - 144 mmol/L    Potassium 4.3 3.4 - 5.3 mmol/L    Chloride 104 94 - 109 mmol/L    Carbon Dioxide 30 20 - 32 mmol/L    Anion Gap 5 3 - 14 mmol/L "    Glucose 94 70 - 99 mg/dL    Urea Nitrogen 25 7 - 30 mg/dL    Creatinine 0.90 0.52 - 1.04 mg/dL    GFR Estimate 63 >60 mL/min/1.7m2    GFR Estimate If Black 77 >60 mL/min/1.7m2    Calcium 9.3 8.5 - 10.1 mg/dL        IMPRESSION:   Reason for surgery/procedure: vitrectomy and epiretinal membrane   Diagnosis/reason for consult: pre op consult    The proposed surgical procedure is considered LOW risk.    REVISED CARDIAC RISK INDEX  The patient has the following serious cardiovascular risks for perioperative complications such as (MI, PE, VFib and 3  AV Block):  No serious cardiac risks  INTERPRETATION: 0 risks: Class I (very low risk - 0.4% complication rate)    The patient has the following additional risks for perioperative complications:  No identified additional risks      ICD-10-CM    1. Preop general physical exam Z01.818        RECOMMENDATIONS:       --Patient is to take all scheduled medications on the day of surgery EXCEPT for modifications listed below.    APPROVAL GIVEN to proceed with proposed procedure, without further diagnostic evaluation       Signed Electronically by: VERONICA Green CNP    Copy of this evaluation report is provided to requesting physician.    [facility] Preop Guidelines

## 2018-03-05 NOTE — NURSING NOTE
"Chief Complaint   Patient presents with     Pre-Op Exam       Initial BP (!) 137/91 (BP Location: Left arm, Cuff Size: Adult Regular)  Pulse 86  Temp 97.5  F (36.4  C) (Tympanic)  Ht 5' 2.4\" (1.585 m)  Wt 106 lb (48.1 kg)  SpO2 99%  BMI 19.14 kg/m2 Estimated body mass index is 19.14 kg/(m^2) as calculated from the following:    Height as of this encounter: 5' 2.4\" (1.585 m).    Weight as of this encounter: 106 lb (48.1 kg).  Medication Reconciliation: complete     Felicia Brantley MA    "

## 2018-03-13 NOTE — H&P (VIEW-ONLY)
[place] [address] [address] [facility] [address]  [phone]  Dept: [phone]    PRE-OP EVALUATION:  Today's date: 3/5/2018    Suzi A Friedell (: 1955) presents for pre-operative evaluation assessment as requested by Dr. Lindquist.  She requires evaluation and anesthesia risk assessment prior to undergoing surgery/procedure for treatment of eye .    Fax number for surgical facility:   Primary Physician: Esther Flores  Type of Anesthesia Anticipated: Combined MAC with Retrobulbar    Patient has a Health Care Directive or Living Will:  NO    Preop Questions 3/3/2018   Who is doing your surgery? Dr. Lindquist   What are you having done? vitrectomy/epiretinal membrane   Date of Surgery/Procedure: 3/15/2018   Facility or Hospital where procedure/surgery will be performed: [facility]   1.  Do you have a history of Heart attack, stroke, stent, coronary bypass surgery, or other heart surgery? No   2.  Do you ever have any pain or discomfort in your chest? No   3.  Do you have a history of  Heart Failure? No   4.   Are you troubled by shortness of breath when:  walking on a level surface, or up a slight hill, or at night? No   5.  Do you currently have a cold, bronchitis or other respiratory infection? No   6.  Do you have a cough, shortness of breath, or wheezing? No   7.  Do you sometimes get pains in the calves of your legs when you walk? No   8. Do you or anyone in your family have previous history of blood clots? No   9.  Do you or does anyone in your family have a serious bleeding problem such as prolonged bleeding following surgeries or cuts? No   10. Have you ever had problems with anemia or been told to take iron pills? No   11. Have you had any abnormal blood loss such as black, tarry or bloody stools, or abnormal vaginal bleeding? No   12. Have you ever had a blood transfusion? No   13. Have you or any of your relatives ever had problems with anesthesia? UNKNOWN -     14. Do you have sleep apnea, excessive snoring or daytime drowsiness? No   15. Do you have any prosthetic heart valves? No   16. Do you have prosthetic joints? No   17. Is there any chance that you may be pregnant? No         HPI:     HPI related to upcoming procedure: vision impairment in left eye occurring over years due to macular puckering      HYPERTENSION - Patient has longstanding history of HTN , currently denies any symptoms referable to elevated blood pressure. Specifically denies chest pain, palpitations, dyspnea, orthopnea, PND or peripheral edema. Blood pressure readings have been in normal range. Current medication regimen is as listed below. Patient denies any side effects of medication.                                                                                                                                                                                          .    MEDICAL HISTORY:     Patient Active Problem List    Diagnosis Date Noted     Benign essential hypertension; goal < 140/90 10/17/2016     Priority: Medium      Past Medical History:   Diagnosis Date     Arthritis     Osteoarthritis     Hypertension      Osteopenia     Did not tolerate Fosamax x1 pill     Past Surgical History:   Procedure Laterality Date      SECTION       COLONOSCOPY  08     EYE SURGERY      Lasik     Current Outpatient Prescriptions   Medication Sig Dispense Refill     lisinopril (PRINIVIL/ZESTRIL) 2.5 MG tablet Take 1 tablet (2.5 mg) by mouth daily 90 tablet 3     Plant Sterols and Stanols (CHOLESTOFF PO) Take 3 tablets by mouth daily        Multiple Vitamin (MULTI VITAMIN PO) Take 1 tablet by mouth daily       Cholecalciferol (VITAMIN D3 PO) Take 400 Units by mouth daily       Magnesium 400 MG CAPS Take 1 capsule by mouth daily       Misc Natural Products (GLUCOSAMINE CHOND COMPLEX/MSM PO) Take 1 tablet by mouth daily       loratadine (CLARITIN) 10 MG tablet Take 10 mg by mouth daily       OTC  "products: NSAIDS rarely    Allergies   Allergen Reactions     Fosamax [Alendronic Acid] Other (See Comments)     Joint pain, unable to move        Latex Allergy: NO    Social History   Substance Use Topics     Smoking status: Never Smoker     Smokeless tobacco: Never Used     Alcohol use 0.0 oz/week      Comment: 1 beer a night     History   Drug Use No       REVIEW OF SYSTEMS:   CONSTITUTIONAL: NEGATIVE for fever, chills, change in weight  ENT/MOUTH: NEGATIVE for ear, mouth and throat problems  RESP: NEGATIVE for significant cough or SOB  CV: NEGATIVE for chest pain, palpitations or peripheral edema  PSYCHIATRIC: NEGATIVE for changes in mood or affect and POSITIVE for anxiety about surgery  ROS otherwise negative    EXAM:   BP (!) 137/91 (BP Location: Left arm, Cuff Size: Adult Regular)  Pulse 86  Temp 97.5  F (36.4  C) (Tympanic)  Ht 5' 2.4\" (1.585 m)  Wt 106 lb (48.1 kg)  SpO2 99%  BMI 19.14 kg/m2    GENERAL APPEARANCE: healthy, alert and no distress  HENT: ear canals and TM's normal and nose and mouth without ulcers or lesions  RESP: lungs clear to auscultation - no rales, rhonchi or wheezes  CV: regular rate and rhythm, normal S1 S2, no S3 or S4 and no murmur, click or rub   ABDOMEN: soft, nontender, no HSM or masses and bowel sounds normal  NEURO: Normal strength and tone, sensory exam grossly normal, mentation intact and speech normal    DIAGNOSTICS:   EKG: Not indicated due to non-vascular surgery and low risk of event (age <65 and without cardiac risk factors)    Recent Labs   Lab Test  10/11/17   0725  10/17/16   1439   HGB  15.1  14.9   PLT  247  251   NA  140  137   POTASSIUM  4.0  5.0   CR  0.91  0.94     Results for orders placed or performed in visit on 03/05/18 (from the past 24 hour(s))   Basic metabolic panel   Result Value Ref Range    Sodium 139 133 - 144 mmol/L    Potassium 4.3 3.4 - 5.3 mmol/L    Chloride 104 94 - 109 mmol/L    Carbon Dioxide 30 20 - 32 mmol/L    Anion Gap 5 3 - 14 mmol/L "    Glucose 94 70 - 99 mg/dL    Urea Nitrogen 25 7 - 30 mg/dL    Creatinine 0.90 0.52 - 1.04 mg/dL    GFR Estimate 63 >60 mL/min/1.7m2    GFR Estimate If Black 77 >60 mL/min/1.7m2    Calcium 9.3 8.5 - 10.1 mg/dL        IMPRESSION:   Reason for surgery/procedure: vitrectomy and epiretinal membrane   Diagnosis/reason for consult: pre op consult    The proposed surgical procedure is considered LOW risk.    REVISED CARDIAC RISK INDEX  The patient has the following serious cardiovascular risks for perioperative complications such as (MI, PE, VFib and 3  AV Block):  No serious cardiac risks  INTERPRETATION: 0 risks: Class I (very low risk - 0.4% complication rate)    The patient has the following additional risks for perioperative complications:  No identified additional risks      ICD-10-CM    1. Preop general physical exam Z01.818        RECOMMENDATIONS:       --Patient is to take all scheduled medications on the day of surgery EXCEPT for modifications listed below.    APPROVAL GIVEN to proceed with proposed procedure, without further diagnostic evaluation       Signed Electronically by: VERONICA Green CNP    Copy of this evaluation report is provided to requesting physician.    [facility] Preop Guidelines

## 2018-03-15 ENCOUNTER — HOSPITAL ENCOUNTER (OUTPATIENT)
Facility: CLINIC | Age: 63
Discharge: HOME | End: 2018-03-15
Attending: OPHTHALMOLOGY | Admitting: OPHTHALMOLOGY
Payer: COMMERCIAL

## 2018-03-15 ENCOUNTER — ANESTHESIA EVENT (OUTPATIENT)
Dept: SURGERY | Facility: CLINIC | Age: 63
End: 2018-03-15
Payer: COMMERCIAL

## 2018-03-15 ENCOUNTER — ANESTHESIA (OUTPATIENT)
Dept: SURGERY | Facility: CLINIC | Age: 63
End: 2018-03-15
Payer: COMMERCIAL

## 2018-03-15 ENCOUNTER — SURGERY (OUTPATIENT)
Age: 63
End: 2018-03-15

## 2018-03-15 VITALS
BODY MASS INDEX: 19.14 KG/M2 | OXYGEN SATURATION: 98 % | WEIGHT: 106 LBS | TEMPERATURE: 97.6 F | SYSTOLIC BLOOD PRESSURE: 115 MMHG | RESPIRATION RATE: 16 BRPM | DIASTOLIC BLOOD PRESSURE: 82 MMHG

## 2018-03-15 PROCEDURE — 27210995 ZZH RX 272: Performed by: OPHTHALMOLOGY

## 2018-03-15 PROCEDURE — 37000009 ZZH ANESTHESIA TECHNICAL FEE, EACH ADDTL 15 MIN: Performed by: OPHTHALMOLOGY

## 2018-03-15 PROCEDURE — 36000104 ZZH EYE SURGERY LEVEL 4 1ST 30 MIN: Performed by: OPHTHALMOLOGY

## 2018-03-15 PROCEDURE — 25000128 H RX IP 250 OP 636: Performed by: NURSE ANESTHETIST, CERTIFIED REGISTERED

## 2018-03-15 PROCEDURE — 25000125 ZZHC RX 250: Performed by: ANESTHESIOLOGY

## 2018-03-15 PROCEDURE — 37000008 ZZH ANESTHESIA TECHNICAL FEE, 1ST 30 MIN: Performed by: OPHTHALMOLOGY

## 2018-03-15 PROCEDURE — 40000170 ZZH STATISTIC PRE-PROCEDURE ASSESSMENT II: Performed by: OPHTHALMOLOGY

## 2018-03-15 PROCEDURE — 27210794 ZZH OR GENERAL SUPPLY STERILE: Performed by: OPHTHALMOLOGY

## 2018-03-15 PROCEDURE — 36000105 ZZH EYE SURGERY LEVEL 4 EA 15 ADDTL MIN: Performed by: OPHTHALMOLOGY

## 2018-03-15 PROCEDURE — 71000028 ZZH EYE RECOVERY PHASE 2 EACH 15 MINS: Performed by: OPHTHALMOLOGY

## 2018-03-15 PROCEDURE — 25000125 ZZHC RX 250: Performed by: OPHTHALMOLOGY

## 2018-03-15 PROCEDURE — 25000128 H RX IP 250 OP 636: Performed by: ANESTHESIOLOGY

## 2018-03-15 PROCEDURE — 25000128 H RX IP 250 OP 636: Performed by: OPHTHALMOLOGY

## 2018-03-15 RX ORDER — DEXAMETHASONE SODIUM PHOSPHATE 10 MG/ML
INJECTION, SOLUTION INTRAMUSCULAR; INTRAVENOUS PRN
Status: DISCONTINUED | OUTPATIENT
Start: 2018-03-15 | End: 2018-03-15 | Stop reason: HOSPADM

## 2018-03-15 RX ORDER — BALANCED SALT SOLUTION 6.4; .75; .48; .3; 3.9; 1.7 MG/ML; MG/ML; MG/ML; MG/ML; MG/ML; MG/ML
SOLUTION OPHTHALMIC PRN
Status: DISCONTINUED | OUTPATIENT
Start: 2018-03-15 | End: 2018-03-15 | Stop reason: HOSPADM

## 2018-03-15 RX ORDER — ATROPINE SULFATE 10 MG/ML
SOLUTION/ DROPS OPHTHALMIC PRN
Status: DISCONTINUED | OUTPATIENT
Start: 2018-03-15 | End: 2018-03-15 | Stop reason: HOSPADM

## 2018-03-15 RX ORDER — CYCLOPENTOLATE HYDROCHLORIDE 10 MG/ML
1 SOLUTION/ DROPS OPHTHALMIC
Status: COMPLETED | OUTPATIENT
Start: 2018-03-15 | End: 2018-03-15

## 2018-03-15 RX ORDER — PROPOFOL 10 MG/ML
INJECTION, EMULSION INTRAVENOUS PRN
Status: DISCONTINUED | OUTPATIENT
Start: 2018-03-15 | End: 2018-03-15

## 2018-03-15 RX ORDER — PHENYLEPHRINE HYDROCHLORIDE 25 MG/ML
1 SOLUTION/ DROPS OPHTHALMIC
Status: COMPLETED | OUTPATIENT
Start: 2018-03-15 | End: 2018-03-15

## 2018-03-15 RX ORDER — ONDANSETRON 2 MG/ML
INJECTION INTRAMUSCULAR; INTRAVENOUS PRN
Status: DISCONTINUED | OUTPATIENT
Start: 2018-03-15 | End: 2018-03-15

## 2018-03-15 RX ORDER — FENTANYL CITRATE 50 UG/ML
INJECTION, SOLUTION INTRAMUSCULAR; INTRAVENOUS PRN
Status: DISCONTINUED | OUTPATIENT
Start: 2018-03-15 | End: 2018-03-15

## 2018-03-15 RX ORDER — SODIUM CHLORIDE, SODIUM LACTATE, POTASSIUM CHLORIDE, CALCIUM CHLORIDE 600; 310; 30; 20 MG/100ML; MG/100ML; MG/100ML; MG/100ML
INJECTION, SOLUTION INTRAVENOUS CONTINUOUS
Status: DISCONTINUED | OUTPATIENT
Start: 2018-03-15 | End: 2018-03-15 | Stop reason: HOSPADM

## 2018-03-15 RX ADMIN — LIDOCAINE HYDROCHLORIDE 6 ML: 20 INJECTION, SOLUTION EPIDURAL; INFILTRATION; INTRACAUDAL; PERINEURAL at 07:20

## 2018-03-15 RX ADMIN — MIDAZOLAM 1 MG: 1 INJECTION INTRAMUSCULAR; INTRAVENOUS at 07:21

## 2018-03-15 RX ADMIN — NEOMYCIN SULFATE, POLYMYXIN B SULFATE, AND DEXAMETHASONE 1 G: 3.5; 10000; 1 OINTMENT OPHTHALMIC at 07:59

## 2018-03-15 RX ADMIN — CYCLOPENTOLATE HYDROCHLORIDE 1 DROP: 10 SOLUTION/ DROPS OPHTHALMIC at 06:22

## 2018-03-15 RX ADMIN — EPINEPHRINE 500 ML: 1 INJECTION, SOLUTION, CONCENTRATE INTRAVENOUS at 07:43

## 2018-03-15 RX ADMIN — MIDAZOLAM 1 MG: 1 INJECTION INTRAMUSCULAR; INTRAVENOUS at 07:23

## 2018-03-15 RX ADMIN — ONDANSETRON 4 MG: 2 INJECTION INTRAMUSCULAR; INTRAVENOUS at 07:43

## 2018-03-15 RX ADMIN — CYCLOPENTOLATE HYDROCHLORIDE 1 DROP: 10 SOLUTION/ DROPS OPHTHALMIC at 06:38

## 2018-03-15 RX ADMIN — ATROPINE SULFATE 1 DROP: 10 SOLUTION OPHTHALMIC at 07:59

## 2018-03-15 RX ADMIN — WATER 0.5 ML: 1 INJECTION INTRAMUSCULAR; INTRAVENOUS; SUBCUTANEOUS at 07:58

## 2018-03-15 RX ADMIN — LIDOCAINE HYDROCHLORIDE 1 ML: 10 INJECTION, SOLUTION EPIDURAL; INFILTRATION; INTRACAUDAL; PERINEURAL at 06:44

## 2018-03-15 RX ADMIN — CYCLOPENTOLATE HYDROCHLORIDE 1 DROP: 10 SOLUTION/ DROPS OPHTHALMIC at 06:30

## 2018-03-15 RX ADMIN — PHENYLEPHRINE HYDROCHLORIDE 1 DROP: 2.5 SOLUTION/ DROPS OPHTHALMIC at 06:22

## 2018-03-15 RX ADMIN — FENTANYL CITRATE 50 MCG: 50 INJECTION, SOLUTION INTRAMUSCULAR; INTRAVENOUS at 07:21

## 2018-03-15 RX ADMIN — DEXAMETHASONE SODIUM PHOSPHATE 5 MG: 10 INJECTION, SOLUTION INTRAMUSCULAR; INTRAVENOUS at 07:58

## 2018-03-15 RX ADMIN — HYPROMELLOSE 2910 (4000 MPA.S) 2 DROP: 25 SOLUTION/ DROPS OPHTHALMIC at 07:43

## 2018-03-15 RX ADMIN — PHENYLEPHRINE HYDROCHLORIDE 1 DROP: 2.5 SOLUTION/ DROPS OPHTHALMIC at 06:30

## 2018-03-15 RX ADMIN — BALANCED SALT SOLUTION 0.5 ML: 6.4; .75; .48; .3; 3.9; 1.7 SOLUTION OPHTHALMIC at 07:42

## 2018-03-15 RX ADMIN — PROPOFOL 30 MG: 10 INJECTION, EMULSION INTRAVENOUS at 07:24

## 2018-03-15 RX ADMIN — SODIUM CHLORIDE, POTASSIUM CHLORIDE, SODIUM LACTATE AND CALCIUM CHLORIDE: 600; 310; 30; 20 INJECTION, SOLUTION INTRAVENOUS at 06:48

## 2018-03-15 RX ADMIN — PHENYLEPHRINE HYDROCHLORIDE 1 DROP: 2.5 SOLUTION/ DROPS OPHTHALMIC at 06:38

## 2018-03-15 RX ADMIN — BALANCED SALT SOLUTION 15 ML: 6.4; .75; .48; .3; 3.9; 1.7 SOLUTION OPHTHALMIC at 07:42

## 2018-03-15 ASSESSMENT — LIFESTYLE VARIABLES: TOBACCO_USE: 0

## 2018-03-15 ASSESSMENT — ENCOUNTER SYMPTOMS: DYSRHYTHMIAS: 0

## 2018-03-15 NOTE — IP AVS SNAPSHOT
MRN:[id number]                      After Visit Summary   3/15/2018    Suzi Amanda Friedell    MRN: [id number]           Thank you!     Thank you for choosing [facility] for your care. Our goal is always to provide you with excellent care. Hearing back from our patients is one way we can continue to improve our services. Please take a few minutes to complete the written survey that you may receive in the mail after you visit with us. Thank you!        Patient Information     Date Of Birth          1955        About your hospital stay     You were admitted on:  March 15, 2018 You last received care in the:  [facility]    You were discharged on:  March 15, 2018       Who to Call     For medical emergencies, please call 911.  For non-urgent questions about your medical care, please call your primary care provider or clinic, [phone]  For questions related to your surgery, please call your surgery clinic        Attending Provider     Provider Harley Moore MD Surgery       Primary Care Provider Office Phone # Fax #    Esther Densie Mark,  [phone]      After Care Instructions     Activity       Avoid strenuous activities the next several days.                  Further instructions from your care team       [facility] Discharge  Instructions  Anesthesia (Eye Care Center)   Adult Discharge Instructions    For 24 hours after surgery    1. Get plenty of rest.  Make arrangements to have a responsible adult stay with you for at least 6 hours after you leave the hospital.  2. Do not drive or use heavy equipment for 24 hours.    3. Do not drink alcohol for 24 hours.  4. Do not sign legal documents or make important decisions for 24 hours.  5. Avoid strenuous or risky activities. You may feel lightheaded.  If so, sit for a few minutes before standing.  Have someone help you get up.   6. Conscious sedation  patients may resume a regular diet..  7. Any questions of medical nature, call your physician.     INSTRUCTIONS FOLLOWING SURGERY  DR. REYNA, DR. NEW, DR. VILLAGOMEZ, DR. BROWN, DR. DAWKINS    Will I have pain?  Some discomfort is normal and expected following surgery.  The first few days after surgery you may need to use prescription pain pills.  Taking Advil (Ibuprofen) regularly may help prevent pain.    Discomfort should gradually decrease and Tylenol or Advil should be sufficient to relieve pain. A foreign body sensation in the cornea of the eye is very common and caused by sutures placed at surgery. These sutures will go away in one to two weeks. If the pain worsens, you should call the doctor.     Do I need to wear an eye patch?  You do not need to wear an eye patch at home after the doctor has removed the patch on your first day after surgery.  However, you may be more comfortable wearing a patch outside in the sun, when sleeping or napping, or in a sriram, windy environment.     How much drainage should I have?  You may expect a moderate amount of drainage for a week.  Gradually the drainage should decrease.  The lids can be cleaned with a clean washcloth and gentle soap or diluted baby shampoo.  Wipe the eyelids gently from the nose outward. Some blood in the tears is a normal finding.    Will there be swelling?  Some swelling is normal for about a week or two after which it will gradually decrease.  Applying a cool compress, using a clean washcloth, for 5 - 10 minutes several times a day may reduce the swelling and make you more comfortable.  People may have some swelling of both eyes, especially if face down positioning is required. The white part of the eye may appear very red or bloody for a week or two. This may get worse a few days after surgery. Though the bright red appearance can look frightening, it is a normal finding early after surgery and will resolve in a few weeks.    Will I need to use  eye drops?  You will be using several different kinds of eye drops or ointment (salve) when you leave the hospital.  The directions will be on each bottle. The medication with the red top will keep your eye dilated and may make your eye more sensitive to light. Wearing sunglasses may help. The other medication is a combination antibiotics/steroid to prevent infection and promote healing.  Occasionally a third drop is used to control the pressure in your eye. A new bottle of artificial tears or lubricant ointment may be used along with your prescription eye drops after surgery. You will be using drops from four to eight weeks. Bring all eye medications (drops, ointments, or pills) with you to each visit.    Always wash your hands before putting in the eye drops.  You may wish to have someone else help you.  Pull down on the lower lid and squeeze one drop from the bottle being careful not to touch the dropper to your eye or eyelid.  One drop is sufficient, but another may be used if the first did not go into the eye.  It is often easier to put in the drops if you are reclining or lying down.  Wait five (5) minutes after the first drop before using the second drop to allow the medications to absorb into the eye.        How long will it take for my vision to improve?  Your vision should gradually improve, but it may take up to six months to regain your best vision.  Frequently, air or gas bubbles are injected into the eye at the time of surgery.  This will blur your vision significantly at first.  As the bubble becomes smaller it will cause a black line in your vision that moves as you move your head.  As the bubble becomes smaller you may notice that it looks more like a bubble or that it will break up into several smaller bubbles. It will take from a few days to a few weeks for the bubble to dissolve and be replaced by clear fluid.     You may notice floaters or double vision after your surgery.  These symptoms  usually will decrease with time.  If the double vision is bothersome patching the eye may help.    If you notice a sudden worsening in your vision call your doctor.    Are there any physical restrictions after surgery?  If an air or gas bubble was placed in the eye during surgery you will be asked to spend most of your time (both awake and during the night) with your head in a specific position, frequently face down.  As the eye heals and the bubble dissolves there will be less of a need for you to stay in that specific position.  You should avoid sleeping on your back until the bubble has totally dissolved and you have been given permission from your surgeon. You should not fly in an airplane or go in high altitudes in the mountains while there is a bubble in your eye. If you should require any other surgery, under general anesthesia, while you still have an air bubble, have your surgeon or anesthetist contact us prior to your surgery. Some anesthetic agents can make the bubble expand and seriously damage your eye.  You will have a green medical alert band placed on your wrist for this reason; this should not be removed until the doctor gives you permission or removes it for you.    Heavy lifting (greater than 50 pounds), swimming and contact sports should be avoided for about 3 to 4 weeks after surgery.    You may resume your usual sexual activities about one week after surgery.    When may I return to work or my normal activities?  Depending on the type of work, you may return to work within a few days.  If your work involves physical activity or driving, you will need to restrict your activities and remain home longer.    You may watch TV, look at magazines, or work puzzles.  Reading may be uncomfortable for several days, but using the eyes will not cause any damage.    You may go outside as usual.  If conditions are windy or sriram wear an eye pad to avoid getting dust or dirt in the eye.    Can I travel?  You  cannot fly in an airplane or drive into the mountains as long as the air or gas bubble remains in your eye.    Are there any driving restrictions?  Someone will need to drive you home from the hospital.  Generally driving can be resumed in several days if you have good vision in your other eye.  If you do not feel comfortable driving, do not drive!  Your depth perception will be decreased so you will want to try driving during the day in light traffic until you feel comfortable driving.  You should restrict your driving while you are taking prescription pain pills as they also can affect your judgment.    When can I shower and wash my hair?  You may shower or bathe when you get home, but avoid getting water in your eye.  You may want someone to help you shampoo your hair at first.      You may shave, brush your teeth, or comb your hair.  Do not use make-up, mascara, or creams/lotions around your eyes for several weeks     When will I see the doctor again?  Generally you will be seen the first day after surgery and again 1-2 weeks later.  If you have not received a return appointment before leaving the hospital, you should call our office during the business hours to arrange an appointment. If you will be seeing your local doctor instead of us, you will need to call that office to set up an appointment.    How do I reach a doctor if I have concerns?  One of our doctors is available by calling [phone] in the [facility], [phone] in [place], or [phone] from outside the area.  After normal office hours the answering service will put you in touch with the doctor on call. The doctors take call from home but are available for a retinal emergency. Please try to call for routine questions and prescription refills during business hours.    You should call your doctor if:      You notice a sudden decrease in your vision.      Have severe pain or pain increases rather than subsiding.      You notice a  new black curtain over your eye that is not the gas bubble.    If you have any of these symptoms, you may need to be examined.    Pending Results     No orders found from 3/13/2018 to 3/16/2018.            Admission Information     Date & Time Provider Department Dept. Phone    3/15/2018 Harley Lindquist MD [facility] [phone]      Your Vitals Were     Blood Pressure Temperature Respirations Weight Pulse Oximetry BMI (Body Mass Index)    114/89 97.6  F (36.4  C) 16 48.1 kg (106 lb) 99% 19.14 kg/m2      MyChart Information     [facility] gives you secure access to your electronic health record. If you see a primary care provider, you can also send messages to your care team and make appointments. If you have questions, please call your primary care clinic.  If you do not have a primary care provider, please call [phone] and they will assist you.        Care EveryWhere ID     This is your Care EveryWhere ID. This could be used by other organizations to access your [facility] medical records  [id number]        Equal Access to Services     BEN MERINO : Hadii dominik Wilhelm, waaxda lulast, qaybta kaalminh simms, adilia lawler. So [facility] [phone].    ATENCIÓN: Si habla español, tiene a biggs disposición servicios gratuitos de asistencia lingüística. Llame al [phone].    We comply with applicable federal civil rights laws and [place] laws. We do not discriminate on the basis of race, color, national origin, age, disability, sex, sexual orientation, or gender identity.               Review of your medicines      UNREVIEWED medicines. Ask your doctor about these medicines        Dose / Directions    CHOLESTOFF PO        Dose:  3 tablet   Take 3 tablets by mouth daily   Refills:  0       GLUCOSAMINE CHOND COMPLEX/MSM PO        Dose:  1 tablet   Take 1 tablet by mouth daily   Refills:  0       lisinopril 2.5 MG tablet   Commonly known as:   PRINIVIL/Zestril   Used for:  Benign essential hypertension        Dose:  2.5 mg   Take 1 tablet (2.5 mg) by mouth daily   Quantity:  90 tablet   Refills:  3       loratadine 10 MG tablet   Commonly known as:  CLARITIN        Dose:  10 mg   Take 10 mg by mouth daily   Refills:  0       Magnesium 400 MG Caps        Dose:  1 capsule   Take 1 capsule by mouth daily   Refills:  0       MULTI VITAMIN PO        Dose:  1 tablet   Take 1 tablet by mouth daily   Refills:  0       VITAMIN D3 PO        Dose:  400 Units   Take 400 Units by mouth daily   Refills:  0                Protect others around you: Learn how to safely use, store and throw away your medicines at www.disposemymeds.org.             Medication List: This is a list of all your medications and when to take them. Check marks below indicate your daily home schedule. Keep this list as a reference.      Medications           Morning Afternoon Evening Bedtime As Needed    CHOLESTOFF PO   Take 3 tablets by mouth daily                                GLUCOSAMINE CHOND COMPLEX/MSM PO   Take 1 tablet by mouth daily                                lisinopril 2.5 MG tablet   Commonly known as:  PRINIVIL/Zestril   Take 1 tablet (2.5 mg) by mouth daily                                loratadine 10 MG tablet   Commonly known as:  CLARITIN   Take 10 mg by mouth daily                                Magnesium 400 MG Caps   Take 1 capsule by mouth daily                                MULTI VITAMIN PO   Take 1 tablet by mouth daily                                VITAMIN D3 PO   Take 400 Units by mouth daily

## 2018-03-15 NOTE — ANESTHESIA POSTPROCEDURE EVALUATION
Patient: Suzi Amanda Friedell    Procedure(s):  LEFT EYE 25 VITRECTOMY, MEMBRANE PEEL, AIR FLUID EXCHANGE  - Wound Class: I-Clean    Diagnosis:EPIRETINAL MEMBRANE  Diagnosis Additional Information: No value filed.    Anesthesia Type:  MAC    Note:  Anesthesia Post Evaluation    Patient location during evaluation: PACU  Patient participation: Able to fully participate in evaluation  Level of consciousness: awake  Pain management: adequate  Cardiovascular status: acceptable  Respiratory status: acceptable  Hydration status: acceptable  PONV: none     Anesthetic complications: None          Last vitals:  Vitals:    03/15/18 0631 03/15/18 0804 03/15/18 0814   BP: (!) 132/93 114/89 115/82   Resp: 16 16 16   Temp: 36.4  C (97.6  F)     SpO2: 100% 99% 98%         Electronically Signed By: MOOSE DE GUZMAN  March 15, 2018  9:57 AM

## 2018-03-15 NOTE — ANESTHESIA PREPROCEDURE EVALUATION
Anesthesia Evaluation     . Pt has had prior anesthetic.     History of anesthetic complications   - PONV        ROS/MED HX    ENT/Pulmonary:      (-) tobacco use and sleep apnea   Neurologic:       Cardiovascular:     (+) hypertension----. : . . . :. .      (-) arrhythmias, irregular heartbeat/palpitations and valvular problems/murmurs   METS/Exercise Tolerance:     Hematologic:         Musculoskeletal:         GI/Hepatic:        (-) GERD   Renal/Genitourinary:         Endo:         Psychiatric:         Infectious Disease:         Malignancy:         Other:                     Physical Exam  Normal systems: pulmonary    Airway   Mallampati: II  TM distance: >3 FB  Neck ROM: full    Dental   Comment: native    Cardiovascular   Rhythm and rate: regular and normal      Pulmonary                     Anesthesia Plan      History & Physical Review  History and physical reviewed and following examination; no interval change.    ASA Status:  2 .    NPO Status:  > 8 hours    Plan for MAC   PONV prophylaxis:  Ondansetron (or other 5HT-3) and Dexamethasone or Solumedrol  Avoid narcotics      Postoperative Care      Consents  Anesthetic plan, risks, benefits and alternatives discussed with:  Patient..                          .

## 2018-03-15 NOTE — ANESTHESIA CARE TRANSFER NOTE
Patient: Suzi Amanda Friedell    Procedure(s):  LEFT EYE 25 VITRECTOMY, MEMBRANE PEEL, AIR FLUID EXCHANGE  - Wound Class: I-Clean    Diagnosis: EPIRETINAL MEMBRANE  Diagnosis Additional Information: No value filed.    Anesthesia Type:   MAC     Note:  Airway :Room Air  Patient transferred to:PACU  Comments: Awake, alert, VSS, report to RN, monitors and alarms on, IV patent.Handoff Report: Identifed the Patient, Identified the Reponsible Provider, Reviewed the pertinent medical history, Discussed the surgical course, Reviewed Intra-OP anesthesia mangement and issues during anesthesia, Set expectations for post-procedure period and Allowed opportunity for questions and acknowledgement of understanding      Vitals: (Last set prior to Anesthesia Care Transfer)    CRNA VITALS  3/15/2018 0730 - 3/15/2018 0807      3/15/2018             Pulse: 68    SpO2: 99 %    Resp Rate (set): 10    EKG: Sinus rhythm                Electronically Signed By: VERONICA Saunders CRNA  March 15, 2018  8:07 AM

## 2018-03-15 NOTE — OP NOTE
Procedure Date: 03/15/2018      DATE OF SURGERY:  03/15/2014.      PREOPERATIVE DIAGNOSIS:  Epiretinal membrane left eye.      POSTOPERATIVE DIAGNOSIS:  Epiretinal membrane left eye.      PROCEDURE:  25 gauge pars plana vitrectomy, membrane peel, air-fluid exchange left eye.        SURGEON:  Harley Lindquist MD      ASSISTANT:  Madhu Raya.       ANESTHESIA:  Local with monitored anesthesia care.      ESTIMATED BLOOD LOSS:  Minimum.      SPECIMENS:  None.      COMPLICATIONS:  None.      INDICATIONS:  This patient presented with decreased vision and distortion in her left eye.  She was found to have a fairly prominent epiretinal membrane.  Surgical intervention to address the macular pathology was offered and the patient decided to proceed after the risks, benefits and alternatives were explained.  Signed and witnessed informed consent was obtained.      DESCRIPTION OF PROCEDURE:  The patient was given a retrobulbar block in the preoperative holding area.  The patient was taken to the operating room and placed in the supine position.  The left eye was prepped and draped in the usual sterile fashion for ophthalmic surgery and a lid speculum was placed.  The eye was opened for standard 25 gauge pars plana vitrectomy.  The eye was entered with a light pipe and vitrectomy probe and the Biom was positioned.  The patient had a preexisting posterior vitreous separation.  A thorough vitrectomy was performed.  Attention was turned to the posterior pole.  A magnifying contact lens was placed on the cornea, the epiretinal membrane and underlying internal limiting membrane were peeled from arcade to arcade with 25 gauge forceps.  The retina was found to be fairly thick and firmly adherent to the retinal surface.  Dilute triamcinolone was then injected over the macular surface to highlight any residual membrane tissue and this was then removed.  The Biom was positioned and the retinal periphery was inspected with scleral depression.   No retinal tears were found.  A partial air-fluid exchange was performed.  All trocars were removed.  The eye was left at physiologic pressure.  Subconjunctival injections of Ancef and Dexamethasone were placed.  Maxitrol and atropine were applied.  A sterile eye pad and shield were taped into position.  The patient was taken to the recovery area in stable condition after tolerating surgery well.  She will follow up in 1 day.         DEL NEW MD             D: 03/15/2018   T: 03/15/2018   MT: CAROLEE      Name:     FRIEDELL, SUZI   MRN:      [id number]        Account:        [id number]   :      1955           Procedure Date: 03/15/2018      Document: [id number]

## 2018-03-15 NOTE — OR NURSING
Green Armband applied to left wrist. Patient instructed to not remove armband until told to do so by physician.     POST-OP POSITION: QIANA Paul RN

## 2018-03-15 NOTE — IP AVS SNAPSHOT
[facility]    [address]    [place] [address]    Phone:  [phone]    Fax:  [phone]                                       After Visit Summary   3/15/2018    Suzi Amanda Friedell    MRN: [id number]           After Visit Summary Signature Page     I have received my discharge instructions, and my questions have been answered. I have discussed any challenges I see with this plan with the nurse or doctor.    ..........................................................................................................................................  Patient/Patient Representative Signature      ..........................................................................................................................................  Patient Representative Print Name and Relationship to Patient    ..................................................               ................................................  Date                                            Time    ..........................................................................................................................................  Reviewed by Signature/Title    ...................................................              ..............................................  Date                                                            Time

## 2018-03-15 NOTE — BRIEF OP NOTE
[facility] Brief Operative Note    Pre-operative diagnosis: EPIRETINAL MEMBRANE, left eye   Post-operative diagnosis EPIRETINAL MEMBRANE, left eye   Procedure: Procedure(s):  LEFT EYE 25 VITRECTOMY, MEMBRANE PEEL, AIR FLUID EXCHANGE  - Wound Class: I-Clean   Surgeon(s): Surgeon(s) and Role:     * Harley Lindquist MD - Primary   Estimated blood loss: * No values recorded between 3/15/2018  7:40 AM and 3/15/2018  8:05 AM *    Specimens: * No specimens in log *   Findings: As above

## 2018-03-15 NOTE — DISCHARGE INSTRUCTIONS
[facility] Discharge  Instructions  Anesthesia ([facility] Center)   Adult Discharge Instructions    For 24 hours after surgery    1. Get plenty of rest.  Make arrangements to have a responsible adult stay with you for at least 6 hours after you leave the hospital.  2. Do not drive or use heavy equipment for 24 hours.    3. Do not drink alcohol for 24 hours.  4. Do not sign legal documents or make important decisions for 24 hours.  5. Avoid strenuous or risky activities. You may feel lightheaded.  If so, sit for a few minutes before standing.  Have someone help you get up.   6. Conscious sedation patients may resume a regular diet..  7. Any questions of medical nature, call your physician.     INSTRUCTIONS FOLLOWING SURGERY  DR. REYNA, DR. NEW, DR. VILLAGOMEZ, DR. BROWN, DR. DAWKINS    Will I have pain?  Some discomfort is normal and expected following surgery.  The first few days after surgery you may need to use prescription pain pills.  Taking Advil (Ibuprofen) regularly may help prevent pain.    Discomfort should gradually decrease and Tylenol or Advil should be sufficient to relieve pain. A foreign body sensation in the cornea of the eye is very common and caused by sutures placed at surgery. These sutures will go away in one to two weeks. If the pain worsens, you should call the doctor.     Do I need to wear an eye patch?  You do not need to wear an eye patch at home after the doctor has removed the patch on your first day after surgery.  However, you may be more comfortable wearing a patch outside in the sun, when sleeping or napping, or in a sriram, windy environment.     How much drainage should I have?  You may expect a moderate amount of drainage for a week.  Gradually the drainage should decrease.  The lids can be cleaned with a clean washcloth and gentle soap or diluted baby shampoo.  Wipe the eyelids gently from the nose outward. Some blood in the tears is a normal  finding.    Will there be swelling?  Some swelling is normal for about a week or two after which it will gradually decrease.  Applying a cool compress, using a clean washcloth, for 5 - 10 minutes several times a day may reduce the swelling and make you more comfortable.  People may have some swelling of both eyes, especially if face down positioning is required. The white part of the eye may appear very red or bloody for a week or two. This may get worse a few days after surgery. Though the bright red appearance can look frightening, it is a normal finding early after surgery and will resolve in a few weeks.    Will I need to use eye drops?  You will be using several different kinds of eye drops or ointment (salve) when you leave the hospital.  The directions will be on each bottle. The medication with the red top will keep your eye dilated and may make your eye more sensitive to light. Wearing sunglasses may help. The other medication is a combination antibiotics/steroid to prevent infection and promote healing.  Occasionally a third drop is used to control the pressure in your eye. A new bottle of artificial tears or lubricant ointment may be used along with your prescription eye drops after surgery. You will be using drops from four to eight weeks. Bring all eye medications (drops, ointments, or pills) with you to each visit.    Always wash your hands before putting in the eye drops.  You may wish to have someone else help you.  Pull down on the lower lid and squeeze one drop from the bottle being careful not to touch the dropper to your eye or eyelid.  One drop is sufficient, but another may be used if the first did not go into the eye.  It is often easier to put in the drops if you are reclining or lying down.  Wait five (5) minutes after the first drop before using the second drop to allow the medications to absorb into the eye.        How long will it take for my vision to improve?  Your vision should  gradually improve, but it may take up to six months to regain your best vision.  Frequently, air or gas bubbles are injected into the eye at the time of surgery.  This will blur your vision significantly at first.  As the bubble becomes smaller it will cause a black line in your vision that moves as you move your head.  As the bubble becomes smaller you may notice that it looks more like a bubble or that it will break up into several smaller bubbles. It will take from a few days to a few weeks for the bubble to dissolve and be replaced by clear fluid.     You may notice floaters or double vision after your surgery.  These symptoms usually will decrease with time.  If the double vision is bothersome patching the eye may help.    If you notice a sudden worsening in your vision call your doctor.    Are there any physical restrictions after surgery?  If an air or gas bubble was placed in the eye during surgery you will be asked to spend most of your time (both awake and during the night) with your head in a specific position, frequently face down.  As the eye heals and the bubble dissolves there will be less of a need for you to stay in that specific position.  You should avoid sleeping on your back until the bubble has totally dissolved and you have been given permission from your surgeon. You should not fly in an airplane or go in high altitudes in the mountains while there is a bubble in your eye. If you should require any other surgery, under general anesthesia, while you still have an air bubble, have your surgeon or anesthetist contact us prior to your surgery. Some anesthetic agents can make the bubble expand and seriously damage your eye.  You will have a green medical alert band placed on your wrist for this reason; this should not be removed until the doctor gives you permission or removes it for you.    Heavy lifting (greater than 50 pounds), swimming and contact sports should be avoided for about 3 to 4  weeks after surgery.    You may resume your usual sexual activities about one week after surgery.    When may I return to work or my normal activities?  Depending on the type of work, you may return to work within a few days.  If your work involves physical activity or driving, you will need to restrict your activities and remain home longer.    You may watch TV, look at magazines, or work puzzles.  Reading may be uncomfortable for several days, but using the eyes will not cause any damage.    You may go outside as usual.  If conditions are windy or sriram wear an eye pad to avoid getting dust or dirt in the eye.    Can I travel?  You cannot fly in an airplane or drive into the mountains as long as the air or gas bubble remains in your eye.    Are there any driving restrictions?  Someone will need to drive you home from the hospital.  Generally driving can be resumed in several days if you have good vision in your other eye.  If you do not feel comfortable driving, do not drive!  Your depth perception will be decreased so you will want to try driving during the day in light traffic until you feel comfortable driving.  You should restrict your driving while you are taking prescription pain pills as they also can affect your judgment.    When can I shower and wash my hair?  You may shower or bathe when you get home, but avoid getting water in your eye.  You may want someone to help you shampoo your hair at first.      You may shave, brush your teeth, or comb your hair.  Do not use make-up, mascara, or creams/lotions around your eyes for several weeks     When will I see the doctor again?  Generally you will be seen the first day after surgery and again 1-2 weeks later.  If you have not received a return appointment before leaving the hospital, you should call our office during the business hours to arrange an appointment. If you will be seeing your local doctor instead of us, you will need to call that office to set up  an appointment.    How do I reach a doctor if I have concerns?  One of our doctors is available by calling [phone] in the [place] area, [phone] in [place], or [phone] from outside the area.  After normal office hours the answering service will put you in touch with the doctor on call. The doctors take call from home but are available for a retinal emergency. Please try to call for routine questions and prescription refills during business hours.    You should call your doctor if:      You notice a sudden decrease in your vision.      Have severe pain or pain increases rather than subsiding.      You notice a new black curtain over your eye that is not the gas bubble.    If you have any of these symptoms, you may need to be examined.

## 2018-03-26 ENCOUNTER — TRANSFERRED RECORDS (OUTPATIENT)
Dept: HEALTH INFORMATION MANAGEMENT | Facility: CLINIC | Age: 63
End: 2018-03-26

## 2018-06-04 ENCOUNTER — TRANSFERRED RECORDS (OUTPATIENT)
Dept: HEALTH INFORMATION MANAGEMENT | Facility: CLINIC | Age: 63
End: 2018-06-04

## 2018-10-14 DIAGNOSIS — M81.0 OSTEOPOROSIS, UNSPECIFIED OSTEOPOROSIS TYPE, UNSPECIFIED PATHOLOGICAL FRACTURE PRESENCE: ICD-10-CM

## 2018-10-14 DIAGNOSIS — I10 BENIGN ESSENTIAL HYPERTENSION: Primary | Chronic | ICD-10-CM

## 2018-10-17 ENCOUNTER — OFFICE VISIT (OUTPATIENT)
Dept: FAMILY MEDICINE | Facility: CLINIC | Age: 63
End: 2018-10-17
Payer: COMMERCIAL

## 2018-10-17 VITALS
SYSTOLIC BLOOD PRESSURE: 132 MMHG | HEIGHT: 62 IN | TEMPERATURE: 97.8 F | BODY MASS INDEX: 18.92 KG/M2 | DIASTOLIC BLOOD PRESSURE: 82 MMHG | OXYGEN SATURATION: 98 % | HEART RATE: 86 BPM | WEIGHT: 102.8 LBS

## 2018-10-17 DIAGNOSIS — R55 VASOVAGAL SYNCOPE: ICD-10-CM

## 2018-10-17 DIAGNOSIS — M81.0 OSTEOPOROSIS, UNSPECIFIED OSTEOPOROSIS TYPE, UNSPECIFIED PATHOLOGICAL FRACTURE PRESENCE: ICD-10-CM

## 2018-10-17 DIAGNOSIS — Z00.00 PREVENTATIVE HEALTH CARE: Primary | ICD-10-CM

## 2018-10-17 DIAGNOSIS — I10 BENIGN ESSENTIAL HYPERTENSION: Chronic | ICD-10-CM

## 2018-10-17 LAB
ALBUMIN SERPL-MCNC: 4.1 G/DL (ref 3.4–5)
ALP SERPL-CCNC: 64 U/L (ref 40–150)
ALT SERPL W P-5'-P-CCNC: 32 U/L (ref 0–50)
ANION GAP SERPL CALCULATED.3IONS-SCNC: 8 MMOL/L (ref 3–14)
AST SERPL W P-5'-P-CCNC: 29 U/L (ref 0–45)
BILIRUB SERPL-MCNC: 0.6 MG/DL (ref 0.2–1.3)
BUN SERPL-MCNC: 20 MG/DL (ref 7–30)
CALCIUM SERPL-MCNC: 9.6 MG/DL (ref 8.5–10.1)
CHLORIDE SERPL-SCNC: 104 MMOL/L (ref 94–109)
CHOLEST SERPL-MCNC: 247 MG/DL
CO2 SERPL-SCNC: 28 MMOL/L (ref 20–32)
CREAT SERPL-MCNC: 0.96 MG/DL (ref 0.52–1.04)
GFR SERPL CREATININE-BSD FRML MDRD: 58 ML/MIN/1.7M2
GLUCOSE SERPL-MCNC: 88 MG/DL (ref 70–99)
HDLC SERPL-MCNC: 93 MG/DL
HGB BLD-MCNC: 14.7 G/DL (ref 11.7–15.7)
LDLC SERPL CALC-MCNC: 135 MG/DL
NONHDLC SERPL-MCNC: 154 MG/DL
POTASSIUM SERPL-SCNC: 4.6 MMOL/L (ref 3.4–5.3)
PROT SERPL-MCNC: 7.9 G/DL (ref 6.8–8.8)
SODIUM SERPL-SCNC: 140 MMOL/L (ref 133–144)
TRIGL SERPL-MCNC: 93 MG/DL

## 2018-10-17 PROCEDURE — 82306 VITAMIN D 25 HYDROXY: CPT | Performed by: INTERNAL MEDICINE

## 2018-10-17 PROCEDURE — 99396 PREV VISIT EST AGE 40-64: CPT | Performed by: INTERNAL MEDICINE

## 2018-10-17 PROCEDURE — 80061 LIPID PANEL: CPT | Performed by: INTERNAL MEDICINE

## 2018-10-17 PROCEDURE — 85018 HEMOGLOBIN: CPT | Performed by: INTERNAL MEDICINE

## 2018-10-17 PROCEDURE — 80053 COMPREHEN METABOLIC PANEL: CPT | Performed by: INTERNAL MEDICINE

## 2018-10-17 PROCEDURE — 36415 COLL VENOUS BLD VENIPUNCTURE: CPT | Performed by: INTERNAL MEDICINE

## 2018-10-17 RX ORDER — LISINOPRIL 2.5 MG/1
2.5 TABLET ORAL DAILY
Qty: 90 TABLET | Refills: 3 | Status: SHIPPED | OUTPATIENT
Start: 2018-10-17 | End: 2019-12-04

## 2018-10-17 NOTE — PROGRESS NOTES
SUBJECTIVE:   CC: Suzi Amanda Friedell is an 62 year old woman who presents for preventive health visit.     Healthy Habits:  Answers for HPI/ROS submitted by the patient on 10/15/2018   Annual Exam:  Getting at least 3 servings of Calcium per day:: Yes  Bi-annual eye exam:: Yes  Dental care twice a year:: Yes  Sleep apnea or symptoms of sleep apnea:: None  Diet:: Regular (no restrictions)  Frequency of exercise:: 6-7 days/week --> treadmill up to 3 miles per day jogging and then 2 miles walking in afternoon; lifting light weights   Taking medications regularly:: Yes  Medication side effects:: None  Additional concerns today:: YES  PHQ-2 Score: 0  Duration of exercise:: Greater than 60 minutes    Elsy is doing well and is quite robust  Home /81 at home this AM  Eye surgery went very well earlier this year  No family h/o colon cancer or bowel changes  Won't take meds for bones given past s/e Fosamax so no sense in checking DEXA; does daily balance exercises too in addition to the remarkable physical activity noted above in HPI  Mammo every 1-2 years is preferred by her  Did have another very short lived what sounds like vasovagal syncopal episode and they are so few and far between that it would be hard to put her on a monitor; recent event happened when she was at a busy work gathering with many people around, it was warm and she hadn't eaten. Was better after sitting down a few minutes and was totally back to normal.    Today's PHQ-2 Score:   PHQ-2 ( 1999 [employer]) 10/15/2018 10/18/2017   Q1: Little interest or pleasure in doing things 0 0   Q2: Feeling down, depressed or hopeless 0 0   PHQ-2 Score 0 0   Q1: Little interest or pleasure in doing things Not at all -   Q2: Feeling down, depressed or hopeless Not at all -   PHQ-2 Score 0 -       Abuse: Current or Past(Physical, Sexual or Emotional)- NO  Do you feel safe in your environment - YES    Social History   Substance Use Topics     Smoking status:  "Never Smoker     Smokeless tobacco: Never Used     Alcohol use 0.0 oz/week      Comment: 1 beer a night     If you drink alcohol do you typically have >3 drinks per day or >7 drinks per week? No                     Reviewed orders with patient.  Reviewed health maintenance and updated orders accordingly - Yes  Patient over age 50, mutual decision to screen reflected in health maintenance.  Pertinent mammograms are reviewed under the imaging tab.  History of abnormal Pap smear: NO - age 30-65 PAP every 5 years with negative HPV co-testing recommended; Normal pap in 2011 and 2014 per [facility] and normal pap with negative HPV in our clinic in 2017    PAP / HPV Latest Ref Rng & Units 10/18/2017   PAP - NIL   HPV 16 DNA NEG:Negative Negative   HPV 18 DNA NEG:Negative Negative   OTHER HR HPV NEG:Negative Negative       ROS:  Comprehensive ROS negative unless as stated above in HPI.     OBJECTIVE:   /82  Pulse 86  Temp 97.8  F (36.6  C) (Oral)  Ht 5' 2.4\" (1.585 m)  Wt 102 lb 12.8 oz (46.6 kg)  SpO2 98%  Breastfeeding? No  BMI 18.56 kg/m2  EXAM:  GENERAL APPEARANCE: healthy, alert and no distress  EYES: Eyes grossly normal to inspection, PERRL and conjunctivae and sclerae normal  HENT: ear canals and TM's normal, mouth without ulcers or lesions, oropharynx clear and oral mucous membranes moist  NECK: no adenopathy, no asymmetry, masses, or scars and thyroid normal to palpation  RESP: lungs clear to auscultation - no rales, rhonchi or wheezes  BREAST: normal without masses, tenderness or nipple discharge and no palpable axillary masses or adenopathy  CV: regular rate and rhythm, normal S1 S2, no S3 or S4, no murmur, click or rub, no peripheral edema and no carotid bruits  ABDOMEN: soft, nontender, no hepatosplenomegaly, no masses and bowel sounds normal  MS: no musculoskeletal defects are noted and gait is age appropriate without ataxia  SKIN: no suspicious lesions or rashes  NEURO: Normal strength and " "tone, mentation intact and speech normal  PSYCH: mentation appears normal and affect normal/bright    ASSESSMENT/PLAN:   1. Preventative health care  Robust and healthy lady  Labs pending  She has had one normal colonoscopy in 2008 and no bowel changes or family history of known colon cancer/polyps so she declines the repeat one that is due this year despite our discussion  Normal pap here last year with negative HPV; Normal pap in 2011 and 2014 per [facility] as well  Mammogram every 1-2 years    2. Benign essential hypertension; goal < 140/90  BP stable on chronic low dose Lisinopril and she periodically checks her BP at home  - Comprehensive metabolic panel  - Lipid panel reflex to direct LDL Fasting  - Hemoglobin  - lisinopril (PRINIVIL/ZESTRIL) 2.5 MG tablet; Take 1 tablet (2.5 mg) by mouth daily  Dispense: 90 tablet; Refill: 3    3. Osteoporosis, unspecified osteoporosis type, unspecified pathological fracture presence  She declines medications  Continue weight bearing exercise, balance exercises and supplements  - Vitamin D Deficiency    4. Vasovagal syncope  Discussed episode per HPI above; no further work up needed at this time as has very rare but predictable and self-limited episodes      COUNSELING:   Reviewed preventive health counseling, as reflected in patient instructions       Regular exercise       Healthy diet/nutrition    BP Readings from Last 1 Encounters:   10/17/18 132/82     Estimated body mass index is 18.56 kg/(m^2) as calculated from the following:    Height as of this encounter: 5' 2.4\" (1.585 m).    Weight as of this encounter: 102 lb 12.8 oz (46.6 kg).     Weight management plan noted, stable and monitoring     reports that she has never smoked. She has never used smokeless tobacco.      Patient Instructions   I'll get back to you on labs  Keep up the great work  If you ever have another \"syncopal\" episode let me know about heart monitor  Establish with Dr. Franks in a year (as I am " transitioning out of the clinic)      Esther Flores,   [facility]

## 2018-10-17 NOTE — PATIENT INSTRUCTIONS
"I'll get back to you on labs  Keep up the great work  If you ever have another \"syncopal\" episode let me know about heart monitor  Establish with Dr. Franks in a year    Preventive Health Recommendations  Female Ages 50 - 64    Yearly exam: See your health care provider every year in order to  o Review health changes.   o Discuss preventive care.    o Review your medicines if your doctor has prescribed any.      Get a Pap test every three years (unless you have an abnormal result and your provider advises testing more often).    If you get Pap tests with HPV test, you only need to test every 5 years, unless you have an abnormal result.     You do not need a Pap test if your uterus was removed (hysterectomy) and you have not had cancer.    You should be tested each year for STDs (sexually transmitted diseases) if you're at risk.     Have a mammogram every 1 to 2 years.    Have a colonoscopy at age 50, or have a yearly FIT test (stool test). These exams screen for colon cancer.      Have a cholesterol test every 5 years, or more often if advised.    Have a diabetes test (fasting glucose) every three years. If you are at risk for diabetes, you should have this test more often.     If you are at risk for osteoporosis (brittle bone disease), think about having a bone density scan (DEXA).    Shots: Get a flu shot each year. Get a tetanus shot every 10 years.    Nutrition:     Eat at least 5 servings of fruits and vegetables each day.    Eat whole-grain bread, whole-wheat pasta and brown rice instead of white grains and rice.    Get adequate Calcium and Vitamin D.     Lifestyle    Exercise at least 150 minutes a week (30 minutes a day, 5 days a week). This will help you control your weight and prevent disease.    Limit alcohol to one drink per day.    No smoking.     Wear sunscreen to prevent skin cancer.     See your dentist every six months for an exam and cleaning.    See your eye doctor every 1 to 2 years.    "

## 2018-10-18 LAB — DEPRECATED CALCIDIOL+CALCIFEROL SERPL-MC: 64 UG/L (ref 20–75)

## 2019-11-09 ENCOUNTER — TELEPHONE (OUTPATIENT)
Dept: FAMILY MEDICINE | Facility: CLINIC | Age: 64
End: 2019-11-09

## 2019-11-09 NOTE — TELEPHONE ENCOUNTER
Reason for Call:  appointment    Detailed comments: Patient made appt. Via [employer] with Bernarda Melchor please see if she's  Ok with [facility] care or if she needs to [facility] with another Provider  Thank you    Phone Number Patient can be reached at: Home number on file [phone] (home)    Best Time: anytime    Can we leave a detailed message on this number? NO    Call taken on 11/9/2019 at 11:14 AM by Kingsley Falcon

## 2019-11-17 ASSESSMENT — ACTIVITIES OF DAILY LIVING (ADL): CURRENT_FUNCTION: NO ASSISTANCE NEEDED

## 2019-11-20 ENCOUNTER — OFFICE VISIT (OUTPATIENT)
Dept: FAMILY MEDICINE | Facility: CLINIC | Age: 64
End: 2019-11-20
Payer: COMMERCIAL

## 2019-11-20 VITALS
WEIGHT: 104 LBS | SYSTOLIC BLOOD PRESSURE: 148 MMHG | BODY MASS INDEX: 18.43 KG/M2 | HEART RATE: 87 BPM | TEMPERATURE: 97.3 F | OXYGEN SATURATION: 97 % | DIASTOLIC BLOOD PRESSURE: 102 MMHG | HEIGHT: 63 IN

## 2019-11-20 DIAGNOSIS — Z23 NEED FOR VACCINATION: ICD-10-CM

## 2019-11-20 DIAGNOSIS — Z00.00 ENCOUNTER FOR MEDICARE ANNUAL WELLNESS EXAM: Primary | ICD-10-CM

## 2019-11-20 DIAGNOSIS — I10 BENIGN ESSENTIAL HYPERTENSION: Chronic | ICD-10-CM

## 2019-11-20 DIAGNOSIS — Z12.11 COLON CANCER SCREENING: ICD-10-CM

## 2019-11-20 PROCEDURE — 90670 PCV13 VACCINE IM: CPT | Performed by: PHYSICIAN ASSISTANT

## 2019-11-20 PROCEDURE — 99397 PER PM REEVAL EST PAT 65+ YR: CPT | Mod: 25 | Performed by: PHYSICIAN ASSISTANT

## 2019-11-20 PROCEDURE — G0009 ADMIN PNEUMOCOCCAL VACCINE: HCPCS | Performed by: PHYSICIAN ASSISTANT

## 2019-11-20 ASSESSMENT — ACTIVITIES OF DAILY LIVING (ADL): CURRENT_FUNCTION: NO ASSISTANCE NEEDED

## 2019-11-20 ASSESSMENT — MIFFLIN-ST. JEOR: SCORE: 972.93

## 2019-11-20 NOTE — NURSING NOTE
Prior to immunization administration, verified patients identity using patient s name and date of birth. Please see Immunization Activity for additional information.     Screening Questionnaire for Adult Immunization    Are you sick today?   No   Do you have allergies to medications, food, a vaccine component or latex?   Yes   Have you ever had a serious reaction after receiving a vaccination?   No   Do you have a long-term health problem with heart disease, lung disease, asthma, kidney disease, metabolic disease (e.g. diabetes), anemia, or other blood disorder?   No   Do you have cancer, leukemia, HIV/AIDS, or any other immune system problem?   No   In the past 3 months, have you taken medications that affect  your immune system, such as prednisone, other steroids, or anticancer drugs; drugs for the treatment of rheumatoid arthritis, Crohn s disease, or psoriasis; or have you had radiation treatments?   No   Have you had a seizure, or a brain or other nervous system problem?   No   During the past year, have you received a transfusion of blood or blood     products, or been given immune (gamma) globulin or antiviral drug?   No   For women: Are you pregnant or is there a chance you could become        pregnant during the next month?   No   Have you received any vaccinations in the past 4 weeks?   No     Immunization questionnaire was positive for at least one answer.  Notified no one.        Per orders of Bernarda Melchor PA-C, injection of prevnar 13  given by Angeles Malcolm CMA. Patient instructed to remain in clinic for 15 minutes afterwards, and to report any adverse reaction to me immediately.     Prior to medication administration, verified patients identity using patient s name and date of birth.       Screening performed by Angeles Malcolm CMA on 11/20/2019 at 11:22 AM.

## 2019-11-20 NOTE — PATIENT INSTRUCTIONS
Patient Education   Personalized Prevention Plan  You are due for the preventive services outlined below.  Your care team is available to assist you in scheduling these services.  If you have already completed any of these items, please share that information with your care team to update in your medical record.  Health Maintenance Due   Topic Date Due     Discuss Advance Care Planning  1955     FIT Test  11/18/1963     Zoster (Shingles) Vaccine (1 of 2) 11/18/2003     FALL RISK ASSESSMENT  11/18/2018     Pneumococcal Vaccine (1 of 2 - PCV13) 11/18/2018     Mammogram  07/21/2019     Flu Vaccine (1) 09/01/2019     Annual Wellness Visit  10/17/2019       Signs of Hearing Loss     Hearing much better with one ear can be a sign of hearing loss.     Hearing loss is a problem shared by many people. In fact, it is one of the most common health conditions, particularly as people age. Most people over age 65 have some hearing loss, and by age 80, almost everyone does. Because hearing loss usually occurs slowly over the years, you may not realize your hearing ability has gotten worse.  Have your hearing checked  Contact your healthcare provider if you:    Have to strain to hear normal conversation    Have to watch other people s faces very carefully to follow what they re saying    Need to ask people to repeat what they ve said    Often misunderstand what people are saying    Turn the volume of the television or radio up so high that others complain    Feel that people are mumbling when they re talking to you    Find that the effort to hear leaves you feeling tired and irritated    Notice, when using the phone, that you hear better with one ear than the other  Date Last Reviewed: 12/1/2016 2000-2018 [employer]. [address] [place] [address], [place] [address]. All rights reserved. This information is not intended as a substitute for professional medical care. Always follow your healthcare professional's  instructions.            Shingrex: new shingles vaccine, check insurance first.    Schedule a nurse only visit for BP check. Bring in some readings and your home monitor for comparison.    Schedule lab only appointment.    Mammogram today [address].    Return FIT test via mail.

## 2019-11-21 ENCOUNTER — HOSPITAL ENCOUNTER (OUTPATIENT)
Dept: MAMMOGRAPHY | Facility: CLINIC | Age: 64
Discharge: HOME | End: 2019-11-21
Attending: PHYSICIAN ASSISTANT | Admitting: PHYSICIAN ASSISTANT
Payer: COMMERCIAL

## 2019-11-21 DIAGNOSIS — Z12.31 VISIT FOR SCREENING MAMMOGRAM: ICD-10-CM

## 2019-11-21 DIAGNOSIS — Z12.11 COLON CANCER SCREENING: ICD-10-CM

## 2019-11-21 PROCEDURE — 77067 SCR MAMMO BI INCL CAD: CPT

## 2019-11-21 PROCEDURE — 82274 ASSAY TEST FOR BLOOD FECAL: CPT | Performed by: PHYSICIAN ASSISTANT

## 2019-11-26 ENCOUNTER — ALLIED HEALTH/NURSE VISIT (OUTPATIENT)
Dept: NURSING | Facility: CLINIC | Age: 64
End: 2019-11-26
Payer: COMMERCIAL

## 2019-11-26 VITALS
HEIGHT: 63 IN | BODY MASS INDEX: 18.43 KG/M2 | SYSTOLIC BLOOD PRESSURE: 134 MMHG | TEMPERATURE: 98.2 F | HEART RATE: 73 BPM | WEIGHT: 104 LBS | OXYGEN SATURATION: 100 % | DIASTOLIC BLOOD PRESSURE: 88 MMHG

## 2019-11-26 DIAGNOSIS — I10 BENIGN ESSENTIAL HYPERTENSION: Chronic | ICD-10-CM

## 2019-11-26 DIAGNOSIS — Z01.30 BP CHECK: Primary | ICD-10-CM

## 2019-11-26 DIAGNOSIS — Z00.00 ENCOUNTER FOR MEDICARE ANNUAL WELLNESS EXAM: ICD-10-CM

## 2019-11-26 LAB
ALBUMIN SERPL-MCNC: 3.8 G/DL (ref 3.4–5)
ALP SERPL-CCNC: 65 U/L (ref 40–150)
ALT SERPL W P-5'-P-CCNC: 29 U/L (ref 0–50)
ANION GAP SERPL CALCULATED.3IONS-SCNC: 7 MMOL/L (ref 3–14)
AST SERPL W P-5'-P-CCNC: 26 U/L (ref 0–45)
BILIRUB SERPL-MCNC: 0.5 MG/DL (ref 0.2–1.3)
BUN SERPL-MCNC: 17 MG/DL (ref 7–30)
CALCIUM SERPL-MCNC: 9.4 MG/DL (ref 8.5–10.1)
CHLORIDE SERPL-SCNC: 105 MMOL/L (ref 94–109)
CHOLEST SERPL-MCNC: 232 MG/DL
CO2 SERPL-SCNC: 26 MMOL/L (ref 20–32)
CREAT SERPL-MCNC: 0.89 MG/DL (ref 0.52–1.04)
ERYTHROCYTE [DISTWIDTH] IN BLOOD BY AUTOMATED COUNT: 11.7 % (ref 10–15)
GFR SERPL CREATININE-BSD FRML MDRD: 67 ML/MIN/{1.73_M2}
GLUCOSE SERPL-MCNC: 100 MG/DL (ref 70–99)
HCT VFR BLD AUTO: 44.8 % (ref 35–47)
HDLC SERPL-MCNC: 91 MG/DL
HEMOCCULT STL QL IA: NEGATIVE
HGB BLD-MCNC: 14.8 G/DL (ref 11.7–15.7)
LDLC SERPL CALC-MCNC: 126 MG/DL
MCH RBC QN AUTO: 31.9 PG (ref 26.5–33)
MCHC RBC AUTO-ENTMCNC: 33 G/DL (ref 31.5–36.5)
MCV RBC AUTO: 97 FL (ref 78–100)
NONHDLC SERPL-MCNC: 141 MG/DL
PLATELET # BLD AUTO: 240 10E9/L (ref 150–450)
POTASSIUM SERPL-SCNC: 4.1 MMOL/L (ref 3.4–5.3)
PROT SERPL-MCNC: 7.7 G/DL (ref 6.8–8.8)
RBC # BLD AUTO: 4.64 10E12/L (ref 3.8–5.2)
SODIUM SERPL-SCNC: 138 MMOL/L (ref 133–144)
TRIGL SERPL-MCNC: 75 MG/DL
WBC # BLD AUTO: 5.5 10E9/L (ref 4–11)

## 2019-11-26 PROCEDURE — 80061 LIPID PANEL: CPT | Performed by: PHYSICIAN ASSISTANT

## 2019-11-26 PROCEDURE — 85027 COMPLETE CBC AUTOMATED: CPT | Performed by: PHYSICIAN ASSISTANT

## 2019-11-26 PROCEDURE — 99207 ZZC NO CHARGE NURSE ONLY: CPT

## 2019-11-26 PROCEDURE — 80053 COMPREHEN METABOLIC PANEL: CPT | Performed by: PHYSICIAN ASSISTANT

## 2019-11-26 PROCEDURE — 36415 COLL VENOUS BLD VENIPUNCTURE: CPT | Performed by: PHYSICIAN ASSISTANT

## 2019-11-26 ASSESSMENT — MIFFLIN-ST. JEOR: SCORE: 972.93

## 2019-11-26 NOTE — Clinical Note
BP: 134/88  Pulse: 73At home 11/20/2019  113/8811/21/2019  121/8011/22/2019  114/7311/23/2019  115/80

## 2019-11-26 NOTE — PROGRESS NOTES
" Patient is checking BP at home    11/20/2019  113/88  11/21/2019  121/80  11/22/2019  114/73  11/23/2019  115/80      Suzi Amanda Friedell is a 64 year old patient who comes in today for a Blood Pressure check.  Initial BP:  /88 (BP Location: Right arm, Patient Position: Sitting, Cuff Size: Adult Regular)   Pulse 73   Temp 98.2  F (36.8  C) (Oral)   Ht 1.588 m (5' 2.5\")   Wt 47.2 kg (104 lb)   SpO2 100%   BMI 18.72 kg/m       73  Disposition: follow-up as previously indicated by provider    "

## 2019-11-29 NOTE — RESULT ENCOUNTER NOTE
"It was a pleasure seeing you for your physical examination.  I wanted to get back to you with your test results.  I have enclosed a copy for your review.      I am happy to report that your CBC or complete blood count is normal with no signs of anemia, leukemia or platelet abnormalities. Your chemistry panel shows no signs of diabetes.  Your blood salts, kidney tests, liver tests, and proteins are all fine.    Your total cholesterol is 232 with the normal range being below 200.  Your HDL or good cholesterol is 91 with the normal range being above 50.  Your LDL or bad cholesterol is 126 with the normal range being below 130.      The \"FIT\" test screening tool for colon cancer was negative.    Let me know if you have any questions.    Bernarda Melchor PA-C    "

## 2019-12-04 DIAGNOSIS — I10 BENIGN ESSENTIAL HYPERTENSION: Chronic | ICD-10-CM

## 2019-12-04 RX ORDER — LISINOPRIL 2.5 MG/1
TABLET ORAL
Qty: 90 TABLET | Refills: 3 | Status: SHIPPED | OUTPATIENT
Start: 2019-12-04 | End: 2020-12-15 | Stop reason: DRUGHIGH

## 2019-12-04 NOTE — TELEPHONE ENCOUNTER
"Requested Prescriptions   Pending Prescriptions Disp Refills     lisinopril (PRINIVIL/ZESTRIL) 2.5 MG tablet [Pharmacy Med Name: LISINOPRIL 2.5MG TABS] 90 tablet 3     Sig: TAKE ONE TABLET BY MOUTH EVERY DAY   Last Written Prescription Date:  10/17/2018  Last Fill Quantity: 90,  # refills: 3   Last office visit: 11/20/2019 with prescribing provider:  Jill   Future Office Visit:        ACE Inhibitors (Including Combos) Protocol Passed - 12/4/2019  7:32 AM        Passed - Blood pressure under 140/90 in past 12 months     BP Readings from Last 3 Encounters:   11/26/19 134/88   11/20/19 (!) 148/102   10/17/18 132/82                 Passed - Recent (12 mo) or future (30 days) visit within the authorizing provider's specialty     Patient has had an office visit with the authorizing provider or a provider within the authorizing providers department within the previous 12 mos or has a future within next 30 days. See \"Patient Info\" tab in inbasket, or \"Choose Columns\" in Meds & Orders section of the refill encounter.              Passed - Medication is active on med list        Passed - Patient is age 18 or older        Passed - No active pregnancy on record        Passed - Normal serum creatinine on file in past 12 months     Recent Labs   Lab Test 11/26/19  0735   CR 0.89             Passed - Normal serum potassium on file in past 12 months     Recent Labs   Lab Test 11/26/19  0735   POTASSIUM 4.1             Passed - No positive pregnancy test within past 12 months        Filled per [facility] protocol.     ROOPA RameyN, RN  [employer] [facility]      "

## 2019-12-06 ENCOUNTER — MYC REFILL (OUTPATIENT)
Dept: FAMILY MEDICINE | Facility: CLINIC | Age: 64
End: 2019-12-06

## 2019-12-06 DIAGNOSIS — I10 BENIGN ESSENTIAL HYPERTENSION: Chronic | ICD-10-CM

## 2019-12-06 RX ORDER — LISINOPRIL 2.5 MG/1
TABLET ORAL
Qty: 90 TABLET | Refills: 3 | OUTPATIENT
Start: 2019-12-06

## 2019-12-06 RX ORDER — LISINOPRIL 2.5 MG/1
2.5 TABLET ORAL DAILY
Qty: 90 TABLET | Refills: 3 | Status: CANCELLED | OUTPATIENT
Start: 2019-12-06

## 2020-02-16 ENCOUNTER — HEALTH MAINTENANCE LETTER (OUTPATIENT)
Age: 65
End: 2020-02-16

## 2020-11-22 ENCOUNTER — HEALTH MAINTENANCE LETTER (OUTPATIENT)
Age: 65
End: 2020-11-22

## 2020-12-15 ENCOUNTER — OFFICE VISIT (OUTPATIENT)
Dept: FAMILY MEDICINE | Facility: CLINIC | Age: 65
End: 2020-12-15
Payer: COMMERCIAL

## 2020-12-15 VITALS
HEIGHT: 62 IN | SYSTOLIC BLOOD PRESSURE: 137 MMHG | OXYGEN SATURATION: 99 % | WEIGHT: 103 LBS | BODY MASS INDEX: 18.95 KG/M2 | HEART RATE: 86 BPM | TEMPERATURE: 96.5 F | DIASTOLIC BLOOD PRESSURE: 94 MMHG

## 2020-12-15 DIAGNOSIS — Z12.11 COLON CANCER SCREENING: ICD-10-CM

## 2020-12-15 DIAGNOSIS — Z12.31 VISIT FOR SCREENING MAMMOGRAM: ICD-10-CM

## 2020-12-15 DIAGNOSIS — I10 BENIGN ESSENTIAL HYPERTENSION: Chronic | ICD-10-CM

## 2020-12-15 DIAGNOSIS — Z23 NEED FOR VACCINATION: Primary | ICD-10-CM

## 2020-12-15 DIAGNOSIS — Z00.00 ENCOUNTER FOR MEDICARE ANNUAL WELLNESS EXAM: ICD-10-CM

## 2020-12-15 LAB
ALBUMIN SERPL-MCNC: 4.1 G/DL (ref 3.4–5)
ALP SERPL-CCNC: 66 U/L (ref 40–150)
ALT SERPL W P-5'-P-CCNC: 27 U/L (ref 0–50)
ANION GAP SERPL CALCULATED.3IONS-SCNC: 4 MMOL/L (ref 3–14)
AST SERPL W P-5'-P-CCNC: 25 U/L (ref 0–45)
BILIRUB SERPL-MCNC: 0.5 MG/DL (ref 0.2–1.3)
BUN SERPL-MCNC: 21 MG/DL (ref 7–30)
CALCIUM SERPL-MCNC: 9.2 MG/DL (ref 8.5–10.1)
CHLORIDE SERPL-SCNC: 107 MMOL/L (ref 94–109)
CHOLEST SERPL-MCNC: 232 MG/DL
CO2 SERPL-SCNC: 28 MMOL/L (ref 20–32)
CREAT SERPL-MCNC: 0.92 MG/DL (ref 0.52–1.04)
ERYTHROCYTE [DISTWIDTH] IN BLOOD BY AUTOMATED COUNT: 11.8 % (ref 10–15)
GFR SERPL CREATININE-BSD FRML MDRD: 64 ML/MIN/{1.73_M2}
GLUCOSE SERPL-MCNC: 101 MG/DL (ref 70–99)
HCT VFR BLD AUTO: 44.3 % (ref 35–47)
HDLC SERPL-MCNC: 86 MG/DL
HGB BLD-MCNC: 14.8 G/DL (ref 11.7–15.7)
LDLC SERPL CALC-MCNC: 130 MG/DL
MCH RBC QN AUTO: 32 PG (ref 26.5–33)
MCHC RBC AUTO-ENTMCNC: 33.4 G/DL (ref 31.5–36.5)
MCV RBC AUTO: 96 FL (ref 78–100)
NONHDLC SERPL-MCNC: 146 MG/DL
PLATELET # BLD AUTO: 231 10E9/L (ref 150–450)
POTASSIUM SERPL-SCNC: 4.5 MMOL/L (ref 3.4–5.3)
PROT SERPL-MCNC: 7.5 G/DL (ref 6.8–8.8)
RBC # BLD AUTO: 4.62 10E12/L (ref 3.8–5.2)
SODIUM SERPL-SCNC: 139 MMOL/L (ref 133–144)
TRIGL SERPL-MCNC: 82 MG/DL
WBC # BLD AUTO: 5.8 10E9/L (ref 4–11)

## 2020-12-15 PROCEDURE — G0009 ADMIN PNEUMOCOCCAL VACCINE: HCPCS | Performed by: PHYSICIAN ASSISTANT

## 2020-12-15 PROCEDURE — 80061 LIPID PANEL: CPT | Performed by: PHYSICIAN ASSISTANT

## 2020-12-15 PROCEDURE — 82274 ASSAY TEST FOR BLOOD FECAL: CPT | Performed by: PHYSICIAN ASSISTANT

## 2020-12-15 PROCEDURE — G0438 PPPS, INITIAL VISIT: HCPCS | Performed by: PHYSICIAN ASSISTANT

## 2020-12-15 PROCEDURE — 90732 PPSV23 VACC 2 YRS+ SUBQ/IM: CPT | Performed by: PHYSICIAN ASSISTANT

## 2020-12-15 PROCEDURE — 85027 COMPLETE CBC AUTOMATED: CPT | Performed by: PHYSICIAN ASSISTANT

## 2020-12-15 PROCEDURE — 80053 COMPREHEN METABOLIC PANEL: CPT | Performed by: PHYSICIAN ASSISTANT

## 2020-12-15 PROCEDURE — 36415 COLL VENOUS BLD VENIPUNCTURE: CPT | Performed by: PHYSICIAN ASSISTANT

## 2020-12-15 RX ORDER — LISINOPRIL 5 MG/1
5 TABLET ORAL DAILY
Qty: 90 TABLET | Refills: 3 | Status: SHIPPED | OUTPATIENT
Start: 2020-12-15 | End: 2021-12-07

## 2020-12-15 RX ORDER — LISINOPRIL 2.5 MG/1
2.5 TABLET ORAL DAILY
Qty: 90 TABLET | Refills: 3 | Status: CANCELLED | OUTPATIENT
Start: 2020-12-15

## 2020-12-15 ASSESSMENT — MIFFLIN-ST. JEOR: SCORE: 961.83

## 2020-12-15 ASSESSMENT — ACTIVITIES OF DAILY LIVING (ADL): CURRENT_FUNCTION: NO ASSISTANCE NEEDED

## 2020-12-15 NOTE — PROGRESS NOTES
"SUBJECTIVE:   Suzi Amanda Friedell is a 65 year old female who presents for Preventive Visit.    Coping well during the pandemic  Walking up to 6 miles per day with her   Took a CRISTIAN from [employer] due to the pandemic  Feels well without concerns.  Home BP runs in the 120s over mid to high 80s  Tends to run higher in the office.  Driving to [place] for Jan-Feb      Patient has been advised of split billing requirements and indicates understanding: Yes   Are you in the first 12 months of your Medicare coverage?  No    Healthy Habits:     In general, how would you rate your overall health?  Excellent    Frequency of exercise:  6-7 days/week (walks 6 miles a day )    Do you usually eat at least 4 servings of fruit and vegetables a day, include whole grains    & fiber and avoid regularly eating high fat or \"junk\" foods?  Yes    Taking medications regularly:  Yes    Barriers to taking medications:  None    Medication side effects:  None    Ability to successfully perform activities of daily living:  No assistance needed    Home Safety:  No safety concerns identified    Hearing Impairment:  No hearing concerns    In the past 6 months, have you been bothered by leaking of urine?  No    In general, how would you rate your overall mental or emotional health?  Excellent      PHQ-2 Total Score: 0    Additional concerns today:  No    Do you feel safe in your environment? Yes    Have you ever done Advance Care Planning? (For example, a Health Directive, POLST, or a discussion with a medical provider or your loved ones about your wishes): Yes, patient states has an Advance Care Planning document and will bring a copy to the clinic.      Fall risk  Fallen 2 or more times in the past year?: No  Any fall with injury in the past year?: No    Cognitive Screening   1) Repeat 3 items (Leader, Season, Table)    2) Clock draw: NORMAL  3) 3 item recall: Recalls 3 objects  Results: 3 items recalled: COGNITIVE IMPAIRMENT LESS " LIKELY    Mini-CogTM Copyright EMILIANA Alba. Licensed by the author for use in [facility]; reprinted with permission (kristi@[facility]). All rights reserved.      Do you have sleep apnea, excessive snoring or daytime drowsiness?: yes    Reviewed and updated as needed this visit by clinical staff  Tobacco  Allergies  Meds    Surg Hx           Reviewed and updated as needed this visit by Provider       Surg Hx          Social History     Tobacco Use     Smoking status: Never Smoker     Smokeless tobacco: Never Used   Substance Use Topics     Alcohol use: Yes     Alcohol/week: 0.0 standard drinks     Comment: 1 beer a night     If you drink alcohol do you typically have >3 drinks per day or >7 drinks per week? No    Alcohol Use 12/15/2020   Prescreen: >3 drinks/day or >7 drinks/week? -   Prescreen: >3 drinks/day or >7 drinks/week? No         Current providers sharing in care for this patient include:   Patient Care Team:  Bernarda Melchor PA-C as PCP - General (Internal Medicine)  Bernarda Melchor PA-C as Assigned PCP    The following health maintenance items are reviewed in Epic and correct as of today:  Health Maintenance   Topic Date Due     ZOSTER IMMUNIZATION (1 of 2) 11/18/2003     INFLUENZA VACCINE (1) 09/01/2020     COLORECTAL CANCER SCREENING  11/21/2020     FALL RISK ASSESSMENT  11/26/2020     Pneumococcal Vaccine: 65+ Years (2 of 2 - PPSV23) 11/20/2020     MAMMO SCREENING  11/21/2021     MEDICARE ANNUAL WELLNESS VISIT  12/15/2021     DEXA  03/27/2023     DTAP/TDAP/TD IMMUNIZATION (2 - Td) 07/17/2024     LIPID  11/26/2024     ADVANCE CARE PLANNING  12/15/2025     HEPATITIS C SCREENING  Completed     PHQ-2  Completed     Pneumococcal Vaccine: Pediatrics (0 to 5 Years) and At-Risk Patients (6 to 64 Years)  Aged Out     IPV IMMUNIZATION  Aged Out     MENINGITIS IMMUNIZATION  Aged Out     HEPATITIS B IMMUNIZATION  Aged Out       Past Medical History:   Diagnosis Date     Arthritis     Osteoarthritis  "    Hypertension      Osteoporosis     Did not tolerate Fosamax x1 pill     Past Surgical History:   Procedure Laterality Date      SECTION  1984     COLONOSCOPY  08     EYE SURGERY      Lasik     VITRECTOMY PARSPLANA WITH 25 GAUGE SYSTEM Left 3/15/2018    Procedure: VITRECTOMY PARSPLANA WITH 25 GAUGE SYSTEM;  LEFT EYE 25 GAUGE  VITRECTOMY, MEMBRANE PEEL, AIR FLUID EXCHANGE ;  Surgeon: Harley Lindquist MD;  Location: [facility]     Current Outpatient Medications   Medication Sig Dispense Refill     Cholecalciferol (VITAMIN D3 PO) Take 400 Units by mouth daily       lisinopril (ZESTRIL) 5 MG tablet Take 1 tablet (5 mg) by mouth daily 90 tablet 3     loratadine (CLARITIN) 10 MG tablet Take 10 mg by mouth daily       Magnesium 400 MG CAPS Take 1 capsule by mouth daily       Multiple Vitamin (MULTI VITAMIN PO) Take 1 tablet by mouth daily       Plant Sterols and Stanols (CHOLESTOFF PO) Take 3 tablets by mouth daily          Review of Systems  Constitutional, HEENT, cardiovascular, pulmonary, GI, , musculoskeletal, neuro, skin, endocrine and psych systems are negative, except as otherwise noted.    OBJECTIVE:   BP (!) 137/94 (BP Location: Right arm, Patient Position: Sitting)   Pulse 86   Temp 96.5  F (35.8  C) (Tympanic)   Ht 1.585 m (5' 2.4\")   Wt 46.7 kg (103 lb)   SpO2 99%   BMI 18.60 kg/m   Estimated body mass index is 18.6 kg/m  as calculated from the following:    Height as of this encounter: 1.585 m (5' 2.4\").    Weight as of this encounter: 46.7 kg (103 lb).  Physical Exam  GENERAL APPEARANCE: healthy, alert and no distress  EYES: Eyes grossly normal to inspection, PERRL and conjunctivae and sclerae normal  HENT: ear canals and TM's normal, nose and mouth without ulcers or lesions, oropharynx clear and oral mucous membranes moist  NECK: no adenopathy, no asymmetry, masses, or scars and thyroid normal to palpation  RESP: lungs clear to auscultation - no rales, rhonchi or wheezes  BREAST: " "normal without masses, tenderness or nipple discharge and no palpable axillary masses or adenopathy  CV: regular rate and rhythm, normal S1 S2, no S3 or S4, no murmur, click or rub, no peripheral edema and peripheral pulses strong  ABDOMEN: soft, nontender, no hepatosplenomegaly, no masses and bowel sounds normal  MS: no musculoskeletal defects are noted and gait is age appropriate without ataxia  SKIN: no suspicious lesions or rashes  NEURO: Normal strength and tone, sensory exam grossly normal, mentation intact and speech normal  PSYCH: mentation appears normal and affect normal/bright        ASSESSMENT / PLAN:   Assessment and Plan:     (Z00.00) Encounter for Medicare annual wellness exam  (primary encounter diagnosis)  Comment: Recd pt get her mammogram, flu shot and shingrex in the next few months. Pneumovax given today. Pt declines dexa as doesn't want to take medication for this.  Plan: Lipid panel reflex to direct LDL Fasting, CBC         with platelets            (I10) Benign essential hypertension; goal < 140/90  Comment: increased lisinopril from 2.5mg to 5mg every day. Cont to monitor.  Plan: lisinopril (ZESTRIL) 5 MG tablet, Comprehensive        metabolic panel            (Z12.11) Colon cancer screening  Comment: declines colonoscopy  Plan: Fecal colorectal cancer screen (FIT)              Patient has been advised of split billing requirements and indicates understanding: Yes  COUNSELING:  Reviewed preventive health counseling, as reflected in patient instructions    Estimated body mass index is 18.6 kg/m  as calculated from the following:    Height as of this encounter: 1.585 m (5' 2.4\").    Weight as of this encounter: 46.7 kg (103 lb).        She reports that she has never smoked. She has never used smokeless tobacco.      Appropriate preventive services were discussed with this patient, including applicable screening as appropriate for cardiovascular disease, diabetes, osteopenia/osteoporosis, and " glaucoma.  As appropriate for age/gender, discussed screening for colorectal cancer, prostate cancer, breast cancer, and cervical cancer. Checklist reviewing preventive services available has been given to the patient.    Reviewed patients plan of care and provided an AVS. The Basic Care Plan (routine screening as documented in Health Maintenance) for Elsy meets the Care Plan requirement. This Care Plan has been established and reviewed with the Patient.    Counseling Resources:  ATP IV Guidelines  Pooled Cohorts Equation Calculator  Breast Cancer Risk Calculator  Breast Cancer: Medication to Reduce Risk  FRAX Risk Assessment  ICSI Preventive Guidelines  Dietary Guidelines for Americans, 2010  USDA's MyPlate  ASA Prophylaxis  Lung CA Screening    WHIT [facility]    Identified Health Risks:

## 2020-12-15 NOTE — PATIENT INSTRUCTIONS
Mammogram [phone]    I would recommend an annual flu shot    Check insurance regarding Shingrex  Patient Education   Personalized Prevention Plan  You are due for the preventive services outlined below.  Your care team is available to assist you in scheduling these services.  If you have already completed any of these items, please share that information with your care team to update in your medical record.  Health Maintenance Due   Topic Date Due     Zoster (Shingles) Vaccine (1 of 2) 11/18/2003     Flu Vaccine (1) 09/01/2020     Colorectal Cancer Screening  11/21/2020     FALL RISK ASSESSMENT  11/26/2020     Pneumococcal Vaccine (2 of 2 - PPSV23) 11/20/2020

## 2020-12-16 RX ORDER — LISINOPRIL 2.5 MG/1
TABLET ORAL
Qty: 90 TABLET | Refills: 3 | OUTPATIENT
Start: 2020-12-16

## 2020-12-17 NOTE — RESULT ENCOUNTER NOTE
It was a pleasure seeing you for your physical examination.  I wanted to get back to you with your test results.     I am happy to report that your CBC or complete blood count is normal with no signs of anemia, leukemia or platelet abnormalities. Your chemistry panel shows no signs of diabetes.  Your blood salts, kidney tests, liver tests, and proteins are all fine.    Your total cholesterol is 232 with the normal range being below 200.  Your HDL or good cholesterol is 86 with the normal range being above 50.  Your LDL or bad cholesterol is 130 with the normal range being below 130.  Good enough!    Happy Holidays.    Bernarda Melchor PA-C

## 2020-12-19 LAB — HEMOCCULT STL QL IA: NEGATIVE

## 2021-03-06 ENCOUNTER — IMMUNIZATION (OUTPATIENT)
Dept: NURSING | Facility: CLINIC | Age: 66
End: 2021-03-06
Payer: COMMERCIAL

## 2021-03-06 PROCEDURE — 91303 PR COVID VAC JANSSEN AD26 0.5ML: CPT

## 2021-03-06 PROCEDURE — 0031A PR COVID VAC JANSSEN AD26 0.5ML: CPT

## 2021-09-19 ENCOUNTER — HEALTH MAINTENANCE LETTER (OUTPATIENT)
Age: 66
End: 2021-09-19

## 2021-11-30 ASSESSMENT — ENCOUNTER SYMPTOMS
HEADACHES: 0
PALPITATIONS: 0
DIZZINESS: 0
BREAST MASS: 0
SORE THROAT: 0
ABDOMINAL PAIN: 1
CHILLS: 0
HEARTBURN: 0
PARESTHESIAS: 0
DYSURIA: 0
SHORTNESS OF BREATH: 0
NAUSEA: 0
FEVER: 0
DIARRHEA: 0
JOINT SWELLING: 0
NERVOUS/ANXIOUS: 0
EYE PAIN: 0
CONSTIPATION: 0
MYALGIAS: 0
FREQUENCY: 0
ARTHRALGIAS: 1
COUGH: 0
WEAKNESS: 0
HEMATURIA: 0
HEMATOCHEZIA: 0

## 2021-11-30 ASSESSMENT — ACTIVITIES OF DAILY LIVING (ADL): CURRENT_FUNCTION: NO ASSISTANCE NEEDED

## 2021-12-02 ENCOUNTER — TELEPHONE (OUTPATIENT)
Dept: FAMILY MEDICINE | Facility: CLINIC | Age: 66
End: 2021-12-02
Payer: COMMERCIAL

## 2021-12-02 DIAGNOSIS — Z00.00 ENCOUNTER FOR MEDICARE ANNUAL WELLNESS EXAM: Primary | ICD-10-CM

## 2021-12-02 DIAGNOSIS — I10 BENIGN ESSENTIAL HYPERTENSION: ICD-10-CM

## 2021-12-02 DIAGNOSIS — Z13.6 CARDIOVASCULAR SCREENING; LDL GOAL LESS THAN 130: ICD-10-CM

## 2021-12-02 NOTE — TELEPHONE ENCOUNTER
Reason for Call: Request for an order or referral:    Order or referral being requested: Labs for Physical on 12/07.    Date needed: as soon as possible    Has the patient been seen by the PCP for this problem? YES    Additional comments: Pt has a lab appt scheduled on 12/06    Phone number Patient can be reached at:  Home number on file [phone] (home)    Best Time:      Can we leave a detailed message on this number?  YES    Call taken on 12/2/2021 at 11:15 AM by Niya Sotomayor

## 2021-12-06 ENCOUNTER — LAB (OUTPATIENT)
Dept: LAB | Facility: CLINIC | Age: 66
End: 2021-12-06
Payer: COMMERCIAL

## 2021-12-06 DIAGNOSIS — Z13.6 CARDIOVASCULAR SCREENING; LDL GOAL LESS THAN 130: ICD-10-CM

## 2021-12-06 DIAGNOSIS — Z00.00 ENCOUNTER FOR MEDICARE ANNUAL WELLNESS EXAM: ICD-10-CM

## 2021-12-06 DIAGNOSIS — I10 BENIGN ESSENTIAL HYPERTENSION: ICD-10-CM

## 2021-12-06 LAB
ALBUMIN SERPL-MCNC: 3.8 G/DL (ref 3.4–5)
ALP SERPL-CCNC: 58 U/L (ref 40–150)
ALT SERPL W P-5'-P-CCNC: 25 U/L (ref 0–50)
ANION GAP SERPL CALCULATED.3IONS-SCNC: 3 MMOL/L (ref 3–14)
AST SERPL W P-5'-P-CCNC: 20 U/L (ref 0–45)
BILIRUB SERPL-MCNC: 0.8 MG/DL (ref 0.2–1.3)
BUN SERPL-MCNC: 18 MG/DL (ref 7–30)
CALCIUM SERPL-MCNC: 9.1 MG/DL (ref 8.5–10.1)
CHLORIDE BLD-SCNC: 106 MMOL/L (ref 94–109)
CO2 SERPL-SCNC: 30 MMOL/L (ref 20–32)
CREAT SERPL-MCNC: 0.88 MG/DL (ref 0.52–1.04)
ERYTHROCYTE [DISTWIDTH] IN BLOOD BY AUTOMATED COUNT: 11.9 % (ref 10–15)
GFR SERPL CREATININE-BSD FRML MDRD: 68 ML/MIN/1.73M2
GLUCOSE BLD-MCNC: 104 MG/DL (ref 70–99)
HCT VFR BLD AUTO: 43.8 % (ref 35–47)
HGB BLD-MCNC: 14.4 G/DL (ref 11.7–15.7)
MCH RBC QN AUTO: 32.4 PG (ref 26.5–33)
MCHC RBC AUTO-ENTMCNC: 32.9 G/DL (ref 31.5–36.5)
MCV RBC AUTO: 99 FL (ref 78–100)
PLATELET # BLD AUTO: 240 10E3/UL (ref 150–450)
POTASSIUM BLD-SCNC: 4.9 MMOL/L (ref 3.4–5.3)
PROT SERPL-MCNC: 7.4 G/DL (ref 6.8–8.8)
RBC # BLD AUTO: 4.44 10E6/UL (ref 3.8–5.2)
SODIUM SERPL-SCNC: 139 MMOL/L (ref 133–144)
WBC # BLD AUTO: 6.6 10E3/UL (ref 4–11)

## 2021-12-06 PROCEDURE — 80061 LIPID PANEL: CPT

## 2021-12-06 PROCEDURE — 85027 COMPLETE CBC AUTOMATED: CPT

## 2021-12-06 PROCEDURE — 36415 COLL VENOUS BLD VENIPUNCTURE: CPT

## 2021-12-06 PROCEDURE — 80053 COMPREHEN METABOLIC PANEL: CPT

## 2021-12-07 ENCOUNTER — OFFICE VISIT (OUTPATIENT)
Dept: FAMILY MEDICINE | Facility: CLINIC | Age: 66
End: 2021-12-07
Payer: COMMERCIAL

## 2021-12-07 VITALS
DIASTOLIC BLOOD PRESSURE: 88 MMHG | HEIGHT: 62 IN | BODY MASS INDEX: 18.77 KG/M2 | RESPIRATION RATE: 14 BRPM | OXYGEN SATURATION: 100 % | TEMPERATURE: 96.9 F | SYSTOLIC BLOOD PRESSURE: 137 MMHG | WEIGHT: 102 LBS | HEART RATE: 75 BPM

## 2021-12-07 DIAGNOSIS — I10 BENIGN ESSENTIAL HYPERTENSION: Chronic | ICD-10-CM

## 2021-12-07 DIAGNOSIS — L82.0 INFLAMED SEBORRHEIC KERATOSIS: ICD-10-CM

## 2021-12-07 DIAGNOSIS — Z78.0 POSTMENOPAUSAL STATUS: ICD-10-CM

## 2021-12-07 DIAGNOSIS — M81.0 OSTEOPOROSIS, UNSPECIFIED OSTEOPOROSIS TYPE, UNSPECIFIED PATHOLOGICAL FRACTURE PRESENCE: ICD-10-CM

## 2021-12-07 DIAGNOSIS — Z13.6 CARDIOVASCULAR SCREENING; LDL GOAL LESS THAN 130: ICD-10-CM

## 2021-12-07 DIAGNOSIS — Z12.11 COLON CANCER SCREENING: ICD-10-CM

## 2021-12-07 DIAGNOSIS — Z00.00 ENCOUNTER FOR MEDICARE ANNUAL WELLNESS EXAM: Primary | ICD-10-CM

## 2021-12-07 PROCEDURE — G0439 PPPS, SUBSEQ VISIT: HCPCS | Performed by: PHYSICIAN ASSISTANT

## 2021-12-07 RX ORDER — LISINOPRIL 5 MG/1
5 TABLET ORAL DAILY
Qty: 90 TABLET | Refills: 3 | Status: SHIPPED | OUTPATIENT
Start: 2021-12-07 | End: 2021-12-08

## 2021-12-07 ASSESSMENT — ACTIVITIES OF DAILY LIVING (ADL): CURRENT_FUNCTION: NO ASSISTANCE NEEDED

## 2021-12-07 ASSESSMENT — MIFFLIN-ST. JEOR: SCORE: 952.27

## 2021-12-07 NOTE — PROGRESS NOTES
"SUBJECTIVE:   Suzi Amanda Friedell is a 66 year old female who presents for Preventive Visit.    HTN: she does check her BP at home and getting normal readings  (116/83)  Tolerates lisinopril without SE  Previsit labs reviewed; lipids pending  She plans to get Shingrex when she returns from [place] where she goes for a few months.  She walks with her  5-7 miles per day    She has a trigger thumb and OA thumbs    She has seb keratoses on her back. Wonders if should see derm.    Stopped her Cholestoff supplement so expects her chol to be a bit high.  This supplement makes her gassy.    Patient has been advised of split billing requirements and indicates understanding: Yes   Are you in the first 12 months of your Medicare coverage?  No    Healthy Habits:    In general, how would you rate your overall health?  Excellent    Frequency of exercise:  6-7 days/week    Duration of exercise:  Greater than 60 minutes    Do you usually eat at least 4 servings of fruit and vegetables a day, include whole grains    & fiber and avoid regularly eating high fat or \"junk\" foods?  Yes    Taking medications regularly:  Yes    Barriers to taking medications:  None    Ability to successfully perform activities of daily living:  No assistance needed    Home Safety:  No safety concerns identified    Hearing Impairment:  No hearing concerns    In the past 6 months, have you been bothered by leaking of urine?  No    In general, how would you rate your overall mental or emotional health?  Excellent      PHQ-2 Total Score:    Additional concerns today:  Yes    Do you feel safe in your environment? Yes    Have you ever done Advance Care Planning? (For example, a Health Directive, POLST, or a discussion with a medical provider or your loved ones about your wishes): Yes, patient states has an Advance Care Planning document and will bring a copy to the clinic.       Fall risk  Fallen 2 or more times in the past year?: No  Any fall with injury in " the past year?: No    Cognitive Screening   1) Repeat 3 items (Leader, Season, Table)    2) Clock draw: NORMAL  3) 3 item recall: Recalls 3 objects  Results: 3 items recalled: COGNITIVE IMPAIRMENT LESS LIKELY    Mini-CogTM Copyright EMILIANA Alba. Licensed by the author for use in [facility]; reprinted with permission (kristi@[email or web address]). All rights reserved.      Do you have sleep apnea, excessive snoring or daytime drowsiness?: no    Reviewed and updated as needed this visit by clinical staff  Tobacco  Allergies  Meds             Reviewed and updated as needed this visit by Provider               Social History     Tobacco Use     Smoking status: Never Smoker     Smokeless tobacco: Never Used   Substance Use Topics     Alcohol use: Yes     Alcohol/week: 0.0 standard drinks     Comment: 1 beer a night     If you drink alcohol do you typically have >3 drinks per day or >7 drinks per week? No      Current providers sharing in care for this patient include:   Patient Care Team:  Bernarda Melchor PA-C as PCP - General (Internal Medicine)  Bernarda Melchor PA-C as Assigned PCP    The following health maintenance items are reviewed in Epic and correct as of today:  Health Maintenance Due   Topic Date Due     ANNUAL REVIEW OF HM ORDERS  Never done     ZOSTER IMMUNIZATION (1 of 2) Never done     PHQ-2  01/01/2021     FALL RISK ASSESSMENT  12/15/2021     COLORECTAL CANCER SCREENING  12/15/2021           FHS-7:   Breast CA Risk Assessment (FHS-7) 11/30/2021   Did any of your first-degree relatives have breast or ovarian cancer? Yes   Did any of your relatives have bilateral breast cancer? Unknown   Did any man in your family have breast cancer? No   Did any woman in your family have breast and ovarian cancer? Yes   Did any woman in your family have breast cancer before age 50 y? Yes   Do you have 2 or more relatives with breast and/or ovarian cancer? Yes   Do you have 2 or more relatives with breast and/or bowel  "cancer? Yes         Pertinent mammograms are reviewed under the imaging tab.    Past Medical History:   Diagnosis Date     Arthritis     Osteoarthritis     Hypertension      Osteoporosis     Did not tolerate Fosamax x1 pill     Past Surgical History:   Procedure Laterality Date      SECTION       COLONOSCOPY  08     EYE SURGERY      Lasik     VITRECTOMY PARSPLANA WITH 25 GAUGE SYSTEM Left 3/15/2018    Procedure: VITRECTOMY PARSPLANA WITH 25 GAUGE SYSTEM;  LEFT EYE 25 GAUGE  VITRECTOMY, MEMBRANE PEEL, AIR FLUID EXCHANGE ;  Surgeon: Harley Lindquist MD;  Location: [facility]     Current Outpatient Medications   Medication Sig Dispense Refill     Cholecalciferol (VITAMIN D3 PO) Take 400 Units by mouth daily       lisinopril (ZESTRIL) 5 MG tablet Take 1 tablet (5 mg) by mouth daily 90 tablet 3     loratadine (CLARITIN) 10 MG tablet Take 10 mg by mouth daily       Magnesium 400 MG CAPS Take 1 capsule by mouth daily       Multiple Vitamin (MULTI VITAMIN PO) Take 1 tablet by mouth daily         Review of Systems  Constitutional, HEENT, cardiovascular, pulmonary, GI, , musculoskeletal, neuro, skin, endocrine and psych systems are negative, except as otherwise noted.    OBJECTIVE:   /88 (BP Location: Right arm, Patient Position: Sitting, Cuff Size: Adult Regular)   Pulse 75   Temp 96.9  F (36.1  C) (Temporal)   Resp 14   Ht 1.585 m (5' 2.4\")   Wt 46.3 kg (102 lb)   SpO2 100%   BMI 18.42 kg/m   Estimated body mass index is 18.42 kg/m  as calculated from the following:    Height as of this encounter: 1.585 m (5' 2.4\").    Weight as of this encounter: 46.3 kg (102 lb).  Physical Exam  GENERAL APPEARANCE: healthy, alert and no distress  EYES: Eyes grossly normal to inspection, PERRL and conjunctivae and sclerae normal  HENT: ear canals and TM's normal, nose and mouth without ulcers or lesions, oropharynx clear and oral mucous membranes moist  NECK: no adenopathy, no asymmetry, masses, or scars and " thyroid normal to palpation  RESP: lungs clear to auscultation - no rales, rhonchi or wheezes  BREAST: normal without masses, tenderness or nipple discharge and no palpable axillary masses or adenopathy  CV: regular rate and rhythm, normal S1 S2, no S3 or S4, no murmur, click or rub, no peripheral edema and peripheral pulses strong  ABDOMEN: soft, nontender, no hepatosplenomegaly, no masses and bowel sounds normal  MS: no musculoskeletal defects are noted and gait is age appropriate without ataxia  SKIN: no suspicious lesions or rashes  NEURO: Normal strength and tone, sensory exam grossly normal, mentation intact and speech normal  PSYCH: mentation appears normal and affect normal/bright        ASSESSMENT / PLAN:   Assessment and Plan:     (Z00.00) Encounter for Medicare annual wellness exam  (primary encounter diagnosis)  Comment: she agrees to get shingrex in the spring  Plan: previsit labs reviewed with pt. Mammogram scheduled for next week. She could try to add on dexa. Declines colonoscopy. Agrees to FIT test.      (I10) Benign essential hypertension; goal < 140/90  Comment: well controlled, cont same.  Plan: lisinopril (ZESTRIL) 5 MG tablet        refilled    (Z13.6) CARDIOVASCULAR SCREENING; LDL GOAL LESS THAN 130  Comment:   Plan: lipids pending. Felt gassy from Cholestoff supplement.    (M81.0) Osteoporosis, unspecified osteoporosis type, unspecified pathological fracture presence  Comment:   Plan: dexa ordered. Failed fosamax in the past. Takes vit D and avid walker.    (Z12.11) Colon cancer screening  Comment:   Plan: Fecal colorectal cancer screen (FIT)            (Z78.0) Postmenopausal status  Comment:   Plan: DX Hip/Pelvis/Spine            (L82.0) Inflamed seborrheic keratosis  Comment: reassured these on back are benign  Plan: Adult Dermatology Referral              Patient has been advised of split billing requirements and indicates understanding: Yes  COUNSELING:  Reviewed preventive health  "counseling, as reflected in patient instructions    Estimated body mass index is 18.42 kg/m  as calculated from the following:    Height as of this encounter: 1.585 m (5' 2.4\").    Weight as of this encounter: 46.3 kg (102 lb).        She reports that she has never smoked. She has never used smokeless tobacco.      Appropriate preventive services were discussed with this patient, including applicable screening as appropriate for cardiovascular disease, diabetes, osteopenia/osteoporosis, and glaucoma.  As appropriate for age/gender, discussed screening for colorectal cancer, prostate cancer, breast cancer, and cervical cancer. Checklist reviewing preventive services available has been given to the patient.    Reviewed patients plan of care and provided an AVS. The Basic Care Plan (routine screening as documented in Health Maintenance) for Elsy meets the Care Plan requirement. This Care Plan has been established and reviewed with the Patient.    Counseling Resources:  ATP IV Guidelines  Pooled Cohorts Equation Calculator  Breast Cancer Risk Calculator  Breast Cancer: Medication to Reduce Risk  FRAX Risk Assessment  ICSI Preventive Guidelines  Dietary Guidelines for Americans, 2010  USDA's MyPlate  ASA Prophylaxis  Lung CA Screening    WHIT [facility]    Identified Health Risks:  "

## 2021-12-08 RX ORDER — LISINOPRIL 5 MG/1
TABLET ORAL
Qty: 90 TABLET | Refills: 3 | Status: SHIPPED | OUTPATIENT
Start: 2021-12-08 | End: 2022-10-12

## 2021-12-09 PROCEDURE — 82274 ASSAY TEST FOR BLOOD FECAL: CPT | Performed by: PHYSICIAN ASSISTANT

## 2021-12-10 LAB — HEMOCCULT STL QL IA: NEGATIVE

## 2021-12-15 LAB
CHOLEST SERPL-MCNC: 237 MG/DL
FASTING STATUS PATIENT QL REPORTED: YES
HDLC SERPL-MCNC: 89 MG/DL
LDLC SERPL CALC-MCNC: 129 MG/DL
NONHDLC SERPL-MCNC: 148 MG/DL
TRIGL SERPL-MCNC: 95 MG/DL

## 2021-12-16 NOTE — RESULT ENCOUNTER NOTE
It was a pleasure seeing you for your physical examination.  I wanted to get back to you with your test results.    I am happy to report that your CBC or complete blood count is normal with no signs of anemia, leukemia or platelet abnormalities. Your chemistry panel shows no signs of diabetes.  Your blood salts, kidney tests, liver tests, and proteins are all fine.    Your total cholesterol is 237 with the normal range being below 200.  Your HDL or good cholesterol is 89 with the normal range being above 50.  Your LDL or bad cholesterol is 129 with the normal range being below 130.  This is the same as last year.  If you were on your supplement last year, it doesn't seem like it may do much.

## 2021-12-17 ENCOUNTER — ANCILLARY PROCEDURE (OUTPATIENT)
Dept: MAMMOGRAPHY | Facility: CLINIC | Age: 66
End: 2021-12-17
Attending: PHYSICIAN ASSISTANT
Payer: COMMERCIAL

## 2021-12-17 DIAGNOSIS — Z12.31 VISIT FOR SCREENING MAMMOGRAM: ICD-10-CM

## 2021-12-17 PROCEDURE — 77067 SCR MAMMO BI INCL CAD: CPT | Mod: TC | Performed by: RADIOLOGY

## 2021-12-20 ENCOUNTER — ANCILLARY PROCEDURE (OUTPATIENT)
Dept: BONE DENSITY | Facility: CLINIC | Age: 66
End: 2021-12-20
Attending: PHYSICIAN ASSISTANT
Payer: COMMERCIAL

## 2021-12-20 DIAGNOSIS — Z78.0 POSTMENOPAUSAL STATUS: ICD-10-CM

## 2021-12-20 PROCEDURE — 77080 DXA BONE DENSITY AXIAL: CPT | Performed by: RADIOLOGY

## 2021-12-22 NOTE — RESULT ENCOUNTER NOTE
Elsy,    Your bone density test does show that you have osteoporosis (thinning of the bones) in both hips and spine. The scores are worse than the test you had years ago as expected.  Please schedule a telephone visit or virtual visit with me so we can discuss treatment options that could reduce your risk of fracture.    Bernarda Melchor PA-C

## 2022-04-26 ENCOUNTER — MYC MEDICAL ADVICE (OUTPATIENT)
Dept: FAMILY MEDICINE | Facility: CLINIC | Age: 67
End: 2022-04-26
Payer: COMMERCIAL

## 2022-10-12 ENCOUNTER — OFFICE VISIT (OUTPATIENT)
Dept: FAMILY MEDICINE | Facility: CLINIC | Age: 67
End: 2022-10-12
Payer: COMMERCIAL

## 2022-10-12 VITALS
HEART RATE: 94 BPM | OXYGEN SATURATION: 99 % | BODY MASS INDEX: 18.58 KG/M2 | HEIGHT: 62 IN | DIASTOLIC BLOOD PRESSURE: 86 MMHG | TEMPERATURE: 96.9 F | SYSTOLIC BLOOD PRESSURE: 125 MMHG | RESPIRATION RATE: 18 BRPM | WEIGHT: 101 LBS

## 2022-10-12 DIAGNOSIS — E55.9 VITAMIN D DEFICIENCY: ICD-10-CM

## 2022-10-12 DIAGNOSIS — Z23 NEED FOR PROPHYLACTIC VACCINATION AND INOCULATION AGAINST INFLUENZA: ICD-10-CM

## 2022-10-12 DIAGNOSIS — J30.2 SEASONAL ALLERGIC RHINITIS, UNSPECIFIED TRIGGER: ICD-10-CM

## 2022-10-12 DIAGNOSIS — Z23 HIGH PRIORITY FOR 2019-NCOV VACCINE: ICD-10-CM

## 2022-10-12 DIAGNOSIS — I10 BENIGN ESSENTIAL HYPERTENSION: Chronic | ICD-10-CM

## 2022-10-12 DIAGNOSIS — Z76.0 ENCOUNTER FOR MEDICATION REFILL: Primary | ICD-10-CM

## 2022-10-12 PROCEDURE — 90662 IIV NO PRSV INCREASED AG IM: CPT | Performed by: INTERNAL MEDICINE

## 2022-10-12 PROCEDURE — 91313 COVID-19,PF,MODERNA BIVALENT: CPT | Performed by: INTERNAL MEDICINE

## 2022-10-12 PROCEDURE — 99214 OFFICE O/P EST MOD 30 MIN: CPT | Mod: 25 | Performed by: INTERNAL MEDICINE

## 2022-10-12 PROCEDURE — 0134A COVID-19,PF,MODERNA BIVALENT: CPT | Performed by: INTERNAL MEDICINE

## 2022-10-12 PROCEDURE — G0008 ADMIN INFLUENZA VIRUS VAC: HCPCS | Performed by: INTERNAL MEDICINE

## 2022-10-12 RX ORDER — LISINOPRIL 5 MG/1
TABLET ORAL
Qty: 90 TABLET | Refills: 3 | Status: SHIPPED | OUTPATIENT
Start: 2022-10-12

## 2022-10-12 ASSESSMENT — PAIN SCALES - GENERAL: PAINLEVEL: NO PAIN (0)

## 2022-10-12 NOTE — PROGRESS NOTES
Kaitlin Chin is a 66 year old presenting for the following health issues:  Establish Care      History of Present Illness       Reason for visit:  Establish Care and refill for Lisinopril    She eats 4 or more servings of fruits and vegetables daily.She consumes 0 sweetened beverage(s) daily.She exercises with enough effort to increase her heart rate 60 or more minutes per day.  She exercises with enough effort to increase her heart rate 7 days per week.   She is taking medications regularly.      Current Medications:     Current Outpatient Medications   Medication Sig Dispense Refill     Cholecalciferol (VITAMIN D3 PO) Take 400 Units by mouth daily       lisinopril (ZESTRIL) 5 MG tablet TAKE ONE TABLET (5 MG) BY MOUTH DAILY 90 tablet 3     loratadine (CLARITIN) 10 MG tablet Take 10 mg by mouth daily as needed       Magnesium 400 MG CAPS Take 1 capsule by mouth daily       Multiple Vitamin (MULTI VITAMIN PO) Take 1 tablet by mouth daily           Allergies:      Allergies   Allergen Reactions     Fosamax [Alendronic Acid] Other (See Comments)     Joint pain, unable to move              Past Medical History:     Past Medical History:   Diagnosis Date     Arthritis     Osteoarthritis     Hypertension      Osteoporosis     Did not tolerate Fosamax x1 pill         Past Surgical History:     Past Surgical History:   Procedure Laterality Date      SECTION       COLONOSCOPY  08     EYE SURGERY      Lasik     VITRECTOMY PARSPLANA WITH 25 GAUGE SYSTEM Left 3/15/2018    Procedure: VITRECTOMY PARSPLANA WITH 25 GAUGE SYSTEM;  LEFT EYE 25 GAUGE  VITRECTOMY, MEMBRANE PEEL, AIR FLUID EXCHANGE ;  Surgeon: Harley Lindquist MD;  Location: [facility]         Family Medical History:     Family History   Problem Relation Age of Onset     Pacemaker Mother      Breast Cancer Mother 44     Hypertension Mother      Hyperlipidemia Mother      Coronary Artery Disease Mother         Pacemaker     Coronary Artery Disease  "Father         Triple Bypass Surgery     Skin Cancer Father         Basal cell skin cancer     Other Cancer Father         Basal cell skin cancer     Breast Cancer Maternal Aunt 70         Social History:     Social History     Socioeconomic History     Marital status:      Spouse name: Not on file     Number of children: Not on file     Years of education: Not on file     Highest education level: Not on file   Occupational History     Not on file   Tobacco Use     Smoking status: Never     Smokeless tobacco: Never   Substance and Sexual Activity     Alcohol use: Yes     Alcohol/week: 0.0 standard drinks     Comment: 1 beer a night     Drug use: No     Sexual activity: Yes     Partners: Male     Birth control/protection: None   Other Topics Concern     Parent/sibling w/ CABG, MI or angioplasty before 65F 55M? No   Social History Narrative     Not on file     Social Determinants of Health     Financial Resource Strain: Not on file   Food Insecurity: Not on file   Transportation Needs: Not on file   Physical Activity: Not on file   Stress: Not on file   Social Connections: Not on file   Intimate Partner Violence: Not on file   Housing Stability: Not on file           Review of System:     Constitutional: Negative for fever or chills  Skin: Negative for rashes  Ears/Nose/Throat: Negative for nasal congestion, sore throat  Respiratory: No shortness of breath, dyspnea on exertion, cough, or hemoptysis  Cardiovascular: Negative for chest pain  Gastrointestinal: Negative for nausea, vomiting  Genitourinary: Negative for dysuria, hematuria  Musculoskeletal: Negative for myalgias  Neurologic: Negative for headaches  Psychiatric: Negative for depression, anxiety  Hematologic/Lymphatic/Immunologic: Negative  Endocrine: Negative  Behavioral: Negative for tobacco use       Physical Exam:   /86   Pulse 94   Temp 96.9  F (36.1  C) (Temporal)   Resp 18   Ht 1.585 m (5' 2.4\")   Wt 45.8 kg (101 lb)   SpO2 99%   " BMI 18.24 kg/m      GENERAL: alert and no distress  EYES: eyes grossly normal to inspection, and conjunctivae and sclerae normal  HENT: Normocephalic atraumatic. Nose and mouth without ulcers or lesions  NECK: supple  RESP: lungs clear to auscultation   CV: regular rate and rhythm, normal S1 S2  LYMPH: no peripheral edema   ABDOMEN: nondistended  MS: no gross musculoskeletal defects noted  SKIN: no suspicious lesions or rashes  NEURO: Alert & Oriented x 3.   PSYCH: mentation appears normal, affect normal        Diagnostic Test Results:     Diagnostic Test Results:  Labs reviewed in Epic    ASSESSMENT/PLAN:       lEsy was seen today for establish care, recheck medication, imm/inj and imm/inj.    Diagnoses and all orders for this visit:    Encounter for medication refill  Benign essential hypertension; goal < 140/90  -     REVIEW OF HEALTH MAINTENANCE PROTOCOL ORDERS  -     lisinopril (ZESTRIL) 5 MG tablet; TAKE ONE TABLET (5 MG) BY MOUTH DAILY    High priority for 2019-nCoV vaccine  -     COVID-19,PF,MODERNA BIVALENT 18+Yrs    Need for prophylactic vaccination and inoculation against influenza  -     INFLUENZA, QUAD, HIGH DOSE, PF, 65YR + (FLUZONE HD)  -     ADMIN INFLUENZA (For MEDICARE Patients ONLY) []    Seasonal allergic rhinitis, unspecified trigger  - stable, continue current therapy    Vitamin D deficiency  - stable, continue current therapy      Follow Up Plan:     Patient is instructed to return to Internal Medicine clinic for follow-up visit in 1 month.        Maryanne Zelaya MD  Internal Medicine  [facility]

## 2023-04-15 ENCOUNTER — HEALTH MAINTENANCE LETTER (OUTPATIENT)
Age: 68
End: 2023-04-15

## 2023-08-19 DIAGNOSIS — I10 BENIGN ESSENTIAL HYPERTENSION: Chronic | ICD-10-CM

## 2023-08-21 RX ORDER — LISINOPRIL 5 MG/1
TABLET ORAL
Qty: 90 TABLET | Refills: 3 | OUTPATIENT
Start: 2023-08-21

## 2023-11-21 ENCOUNTER — PATIENT OUTREACH (OUTPATIENT)
Dept: CARE COORDINATION | Facility: CLINIC | Age: 68
End: 2023-11-21
Payer: COMMERCIAL

## 2024-03-18 ENCOUNTER — PATIENT OUTREACH (OUTPATIENT)
Dept: CARE COORDINATION | Facility: CLINIC | Age: 69
End: 2024-03-18
Payer: COMMERCIAL

## 2024-04-01 ENCOUNTER — PATIENT OUTREACH (OUTPATIENT)
Dept: CARE COORDINATION | Facility: CLINIC | Age: 69
End: 2024-04-01
Payer: COMMERCIAL

## 2025-05-22 ENCOUNTER — PATIENT OUTREACH (OUTPATIENT)
Dept: CARE COORDINATION | Facility: CLINIC | Age: 70
End: 2025-05-22
Payer: COMMERCIAL

## 2025-08-02 ENCOUNTER — HEALTH MAINTENANCE LETTER (OUTPATIENT)
Age: 70
End: 2025-08-02

## (undated) DEVICE — EYE LENS VITRECTOMY MAGNIFYING ODVM

## (undated) DEVICE — EYE NDL RETROBULBAR 25GA 1.5" 581275

## (undated) DEVICE — GLOVE PROTEXIS MICRO 7.0  2D73PM70

## (undated) DEVICE — TAPE MICROPORE 1"X1.5YD 1530S-1

## (undated) DEVICE — NDL 18GA 1.5" 305196

## (undated) DEVICE — EYE SOL BSS 500ML

## (undated) DEVICE — DRAPE MICROSCOPE DRAPE  EYE DI40001

## (undated) DEVICE — GLOVE PROTEXIS MICRO 8.0  2D73PM80

## (undated) DEVICE — EYE FORCEP TIP ADV ILM DISP 25GA 725.44

## (undated) DEVICE — LINEN TOWEL PACK X5 5464

## (undated) DEVICE — PACK VITRECTOMY PQ15VI57E

## (undated) DEVICE — EYE SHIELD PLASTIC

## (undated) DEVICE — EYE PACK 25GA PLUS CONSTELLATION PPK4253

## (undated) DEVICE — SYR 05ML SLIP TIP W/O NDL

## (undated) DEVICE — EYE CANN SOFT TIP 25GA FOR VALVED SET 8065149530

## (undated) DEVICE — SYR 05ML SLIP TIP W/O NDL 309647

## (undated) RX ORDER — WATER 10 ML/10ML
INJECTION INTRAMUSCULAR; INTRAVENOUS; SUBCUTANEOUS
Status: DISPENSED
Start: 2018-03-15

## (undated) RX ORDER — CEFAZOLIN SODIUM 500 MG/2.2ML
INJECTION, POWDER, FOR SOLUTION INTRAMUSCULAR; INTRAVENOUS
Status: DISPENSED
Start: 2018-03-15

## (undated) RX ORDER — ATROPINE SULFATE 10 MG/ML
SOLUTION/ DROPS OPHTHALMIC
Status: DISPENSED
Start: 2018-03-15

## (undated) RX ORDER — FENTANYL CITRATE 50 UG/ML
INJECTION, SOLUTION INTRAMUSCULAR; INTRAVENOUS
Status: DISPENSED
Start: 2018-03-15

## (undated) RX ORDER — DEXAMETHASONE SODIUM PHOSPHATE 10 MG/ML
INJECTION, SOLUTION INTRAMUSCULAR; INTRAVENOUS
Status: DISPENSED
Start: 2018-03-15

## (undated) RX ORDER — TRIAMCINOLONE ACETONIDE 40 MG/ML
INJECTION, SUSPENSION INTRA-ARTICULAR; INTRAMUSCULAR
Status: DISPENSED
Start: 2018-03-15